# Patient Record
Sex: FEMALE | Race: WHITE | NOT HISPANIC OR LATINO | Employment: OTHER | URBAN - METROPOLITAN AREA
[De-identification: names, ages, dates, MRNs, and addresses within clinical notes are randomized per-mention and may not be internally consistent; named-entity substitution may affect disease eponyms.]

---

## 2017-08-22 PROBLEM — J40 BRONCHITIS: Status: ACTIVE | Noted: 2017-08-22

## 2018-10-03 PROBLEM — R10.13 EPIGASTRIC PAIN: Status: ACTIVE | Noted: 2018-10-03

## 2020-06-03 PROBLEM — J43.2 CENTRIACINAR EMPHYSEMA: Status: ACTIVE | Noted: 2020-06-03

## 2022-05-11 ENCOUNTER — TELEPHONE (OUTPATIENT)
Dept: FAMILY MEDICINE | Facility: CLINIC | Age: 82
End: 2022-05-11
Payer: MEDICARE

## 2022-05-11 NOTE — TELEPHONE ENCOUNTER
----- Message from Lynette Sweeney sent at 5/11/2022  2:22 PM CDT -----  Regarding: appt  Contact: [phone]  Patient Carina is calling. Patient would like to be seen by Dr Diaz only and no appts are showing. Please call patient at [phone]      Thank You

## 2022-05-11 NOTE — TELEPHONE ENCOUNTER
Patient reports her daughter, son, and son's mother in law see you as patients and you came highly recommended. Requesting to schedule appt to establish care.

## 2022-05-20 ENCOUNTER — OFFICE VISIT (OUTPATIENT)
Dept: FAMILY MEDICINE | Facility: CLINIC | Age: 82
End: 2022-05-20
Payer: MEDICARE

## 2022-05-20 VITALS
DIASTOLIC BLOOD PRESSURE: 70 MMHG | BODY MASS INDEX: 16.93 KG/M2 | HEIGHT: 65 IN | OXYGEN SATURATION: 97 % | TEMPERATURE: 98 F | WEIGHT: 101.63 LBS | SYSTOLIC BLOOD PRESSURE: 130 MMHG | HEART RATE: 102 BPM | RESPIRATION RATE: 18 BRPM

## 2022-05-20 DIAGNOSIS — M79.7 FIBROMYALGIA: ICD-10-CM

## 2022-05-20 DIAGNOSIS — L40.9 PSORIASIS OF SCALP: ICD-10-CM

## 2022-05-20 DIAGNOSIS — E03.9 HYPOTHYROIDISM, UNSPECIFIED TYPE: ICD-10-CM

## 2022-05-20 DIAGNOSIS — M81.0 AGE-RELATED OSTEOPOROSIS WITHOUT CURRENT PATHOLOGICAL FRACTURE: ICD-10-CM

## 2022-05-20 DIAGNOSIS — J43.9 PULMONARY EMPHYSEMA, UNSPECIFIED EMPHYSEMA TYPE: ICD-10-CM

## 2022-05-20 DIAGNOSIS — D75.839 THROMBOCYTOSIS: ICD-10-CM

## 2022-05-20 DIAGNOSIS — I49.5 SSS (SICK SINUS SYNDROME): ICD-10-CM

## 2022-05-20 DIAGNOSIS — I87.2 VENOUS INSUFFICIENCY: ICD-10-CM

## 2022-05-20 DIAGNOSIS — I48.0 PAROXYSMAL ATRIAL FIBRILLATION: Primary | ICD-10-CM

## 2022-05-20 DIAGNOSIS — M79.10 MYALGIA: ICD-10-CM

## 2022-05-20 DIAGNOSIS — K21.9 GASTROESOPHAGEAL REFLUX DISEASE WITHOUT ESOPHAGITIS: ICD-10-CM

## 2022-05-20 DIAGNOSIS — Z95.0 PACEMAKER: ICD-10-CM

## 2022-05-20 DIAGNOSIS — I77.6 ARTERITIS, UNSPECIFIED: ICD-10-CM

## 2022-05-20 LAB
ALBUMIN SERPL BCP-MCNC: 3.6 G/DL (ref 3.5–5.2)
ALP SERPL-CCNC: 106 U/L (ref 55–135)
ALT SERPL W/O P-5'-P-CCNC: 19 U/L (ref 10–44)
ANION GAP SERPL CALC-SCNC: 12 MMOL/L (ref 8–16)
AST SERPL-CCNC: 16 U/L (ref 10–40)
BASOPHILS # BLD AUTO: 0.03 K/UL (ref 0–0.2)
BASOPHILS NFR BLD: 0.4 % (ref 0–1.9)
BILIRUB SERPL-MCNC: 0.5 MG/DL (ref 0.1–1)
BUN SERPL-MCNC: 23 MG/DL (ref 8–23)
CALCIUM SERPL-MCNC: 9.3 MG/DL (ref 8.7–10.5)
CHLORIDE SERPL-SCNC: 98 MMOL/L (ref 95–110)
CHOLEST SERPL-MCNC: 162 MG/DL (ref 120–199)
CHOLEST/HDLC SERPL: 2.5 {RATIO} (ref 2–5)
CK SERPL-CCNC: 29 U/L (ref 20–180)
CO2 SERPL-SCNC: 29 MMOL/L (ref 23–29)
CREAT SERPL-MCNC: 0.7 MG/DL (ref 0.5–1.4)
CRP SERPL-MCNC: 51.2 MG/L (ref 0–8.2)
DIFFERENTIAL METHOD: ABNORMAL
EOSINOPHIL # BLD AUTO: 0 K/UL (ref 0–0.5)
EOSINOPHIL NFR BLD: 0.4 % (ref 0–8)
ERYTHROCYTE [DISTWIDTH] IN BLOOD BY AUTOMATED COUNT: 13.5 % (ref 11.5–14.5)
ERYTHROCYTE [SEDIMENTATION RATE] IN BLOOD BY WESTERGREN METHOD: 18 MM/HR (ref 0–36)
EST. GFR  (AFRICAN AMERICAN): >60 ML/MIN/1.73 M^2
EST. GFR  (NON AFRICAN AMERICAN): >60 ML/MIN/1.73 M^2
GLUCOSE SERPL-MCNC: 95 MG/DL (ref 70–110)
HCT VFR BLD AUTO: 42.1 % (ref 37–48.5)
HDLC SERPL-MCNC: 66 MG/DL (ref 40–75)
HDLC SERPL: 40.7 % (ref 20–50)
HGB BLD-MCNC: 13 G/DL (ref 12–16)
IMM GRANULOCYTES # BLD AUTO: 0.02 K/UL (ref 0–0.04)
IMM GRANULOCYTES NFR BLD AUTO: 0.3 % (ref 0–0.5)
LDLC SERPL CALC-MCNC: 84 MG/DL (ref 63–159)
LYMPHOCYTES # BLD AUTO: 1.3 K/UL (ref 1–4.8)
LYMPHOCYTES NFR BLD: 16.7 % (ref 18–48)
MCH RBC QN AUTO: 29.3 PG (ref 27–31)
MCHC RBC AUTO-ENTMCNC: 30.9 G/DL (ref 32–36)
MCV RBC AUTO: 95 FL (ref 82–98)
MONOCYTES # BLD AUTO: 0.7 K/UL (ref 0.3–1)
MONOCYTES NFR BLD: 9.9 % (ref 4–15)
NEUTROPHILS # BLD AUTO: 5.4 K/UL (ref 1.8–7.7)
NEUTROPHILS NFR BLD: 72.3 % (ref 38–73)
NONHDLC SERPL-MCNC: 96 MG/DL
NRBC BLD-RTO: 0 /100 WBC
PHOSPHATE SERPL-MCNC: 3.6 MG/DL (ref 2.7–4.5)
PLATELET # BLD AUTO: 324 K/UL (ref 150–450)
PMV BLD AUTO: 11.6 FL (ref 9.2–12.9)
POTASSIUM SERPL-SCNC: 4 MMOL/L (ref 3.5–5.1)
PROT SERPL-MCNC: 7.7 G/DL (ref 6–8.4)
RBC # BLD AUTO: 4.43 M/UL (ref 4–5.4)
SODIUM SERPL-SCNC: 139 MMOL/L (ref 136–145)
TRIGL SERPL-MCNC: 60 MG/DL (ref 30–150)
TSH SERPL DL<=0.005 MIU/L-ACNC: 2.89 UIU/ML (ref 0.4–4)
WBC # BLD AUTO: 7.5 K/UL (ref 3.9–12.7)

## 2022-05-20 PROCEDURE — 80053 COMPREHEN METABOLIC PANEL: CPT | Performed by: INTERNAL MEDICINE

## 2022-05-20 PROCEDURE — 36415 PR COLLECTION VENOUS BLOOD,VENIPUNCTURE: ICD-10-PCS | Mod: S$GLB,,, | Performed by: INTERNAL MEDICINE

## 2022-05-20 PROCEDURE — 99204 PR OFFICE/OUTPT VISIT, NEW, LEVL IV, 45-59 MIN: ICD-10-PCS | Mod: S$GLB,,, | Performed by: INTERNAL MEDICINE

## 2022-05-20 PROCEDURE — 84443 ASSAY THYROID STIM HORMONE: CPT | Performed by: INTERNAL MEDICINE

## 2022-05-20 PROCEDURE — 86140 C-REACTIVE PROTEIN: CPT | Performed by: INTERNAL MEDICINE

## 2022-05-20 PROCEDURE — 84100 ASSAY OF PHOSPHORUS: CPT | Performed by: INTERNAL MEDICINE

## 2022-05-20 PROCEDURE — 86038 ANTINUCLEAR ANTIBODIES: CPT | Performed by: INTERNAL MEDICINE

## 2022-05-20 PROCEDURE — 84165 PATHOLOGIST INTERPRETATION SPE: ICD-10-PCS | Mod: 26,,, | Performed by: PATHOLOGY

## 2022-05-20 PROCEDURE — 99204 OFFICE O/P NEW MOD 45 MIN: CPT | Mod: S$GLB,,, | Performed by: INTERNAL MEDICINE

## 2022-05-20 PROCEDURE — 36415 COLL VENOUS BLD VENIPUNCTURE: CPT | Mod: S$GLB,,, | Performed by: INTERNAL MEDICINE

## 2022-05-20 PROCEDURE — 82550 ASSAY OF CK (CPK): CPT | Performed by: INTERNAL MEDICINE

## 2022-05-20 PROCEDURE — 80061 LIPID PANEL: CPT | Performed by: INTERNAL MEDICINE

## 2022-05-20 PROCEDURE — 84165 PROTEIN E-PHORESIS SERUM: CPT | Mod: 26,,, | Performed by: PATHOLOGY

## 2022-05-20 PROCEDURE — 84165 PROTEIN E-PHORESIS SERUM: CPT | Performed by: INTERNAL MEDICINE

## 2022-05-20 PROCEDURE — 85025 COMPLETE CBC W/AUTO DIFF WBC: CPT | Performed by: INTERNAL MEDICINE

## 2022-05-20 PROCEDURE — 85652 RBC SED RATE AUTOMATED: CPT | Performed by: INTERNAL MEDICINE

## 2022-05-20 PROCEDURE — 86592 SYPHILIS TEST NON-TREP QUAL: CPT | Performed by: INTERNAL MEDICINE

## 2022-05-20 RX ORDER — CHOLECALCIFEROL (VITAMIN D3) 25 MCG
1000 TABLET ORAL
COMMUNITY

## 2022-05-20 RX ORDER — CALCIUM CARBONATE 500(1250)
1 TABLET,CHEWABLE ORAL DAILY
COMMUNITY

## 2022-05-20 RX ORDER — MAG HYDROX/ALUMINUM HYD/SIMETH 200-200-20
30 SUSPENSION, ORAL (FINAL DOSE FORM) ORAL
COMMUNITY

## 2022-05-20 NOTE — PROGRESS NOTES
Subjective:       Patient ID: Carina Elise is a 81 y.o. female.    Medication List with Changes/Refills   Current Medications    ALUMINUM-MAGNESIUM HYDROXIDE-SIMETHICONE (MAALOX) 200-200-20 MG/5 ML SUSP    Take 30 mLs by mouth 4 (four) times daily before meals and nightly.    ASPIRIN (ECOTRIN) 81 MG EC TABLET    Take 81 mg by mouth once daily.    CALCIUM CARBONATE (OS-TYRELL) 500 MG CALCIUM (1,250 MG) CHEWABLE TABLET    Take 1 tablet by mouth once daily.    LEVOTHYROXINE (SYNTHROID) 112 MCG TABLET    Take 112 mcg by mouth once daily.    LIOTHYRONINE (CYTOMEL) 5 MCG TAB    Take 5 mcg by mouth once daily.    LYSINE ORAL    Take 500 mg by mouth once daily.    METOPROLOL SUCCINATE (TOPROL-XL) 25 MG 24 HR TABLET    Take 12.5 mg by mouth once daily. Take half tab daily    PROPAFENONE (RHTHYMOL) 150 MG TAB    Take 150 mg by mouth 2 (two) times daily.    VITAMIN D (VITAMIN D3) 1000 UNITS TAB    Take 1,000 Units by mouth.       Chief Complaint: Establish Care  She is a new patient here today for a second opinion.     Today she presents with worsening weakness and a second opinion about recommendation for a temporal artery biopsy.  She fell in November of 2021 and hit her head and LOC. CT of the head was negative but she started having significant shoulder muscle pain after that fall. She has tight muscles in bilateral trapezius muscles that radiate from her neck into her scalp.  She tried dry needling without success.  In April she developed worsening myalgias and attributed this to a flare in her fibromyalgia.  At the same time she developed pain in her jaw with difficulty opening her mouth. She began with sensitivity on her bilateral temples and forehead.  She was noted to have elevated inflammatory markers on 5/2022 with ESR 64 and CRP 12.  She was followed by physician who was concerned about PMR vs TA and recommended TA biopsy but patient refused. She was tried on low dose oral prednisone (because she could not open her  mouth to swallow pills) but this made no change in her symptoms so was stopped. She was seen by a tmj specialist who did not feel her symptoms were due to tmj but rather due to muscle weakness coming from her tight trapezius muscles. She was given a wedge to wear at night which alleviated any pain and is doing exercises to strengthen her muscles.  She denies any pain with chewing but does fatigue with chewing. She has been on an all liquid diet for 6 weeks. She does feel over the last 6 weeks her symptoms are improving. She continues with generalized weakness with some pain in her muscles but no difficulty using her muscles of her arms or legs. She continues with weakness with chewing food and trouble opening her mouth fully even though this is also improving. She denies headaches but does have bilateral temple and forehead sensitivity and head pressure.  No vision changes. No difficulty getting out of a chair or brushing her hair. No fevers. No joint pain. Her neck muscles still are very tight and cause her pain.  She still does not want biopsy. CT of the soft tissue of the neck on 5/2022 was normal.     She has emphysema and is followed by pulmonary. She was last seen on 3/2021. She has chronic shortness of breath but denies wheezing or coughing. She does not use inhalers. CXR on 5/2022 was clear except for hyperinflation.     She as paroxysmal Afib s/p RFA in 2011.  She has a pacemaker due SSS and denies any recurrent Afib since ablation. No known CAD or hypertension. She is taking rhthymol 150 mg tid and metoprolol 12.5 mg daily. She is anticoagulated with aspirin daily. She is managed by Dr. Lao in cardiology and was seen on 1/2022.     She has venous insufficiency with intermittent lower leg swelling. She had venous ablation surgery in the past.     She has hypothyroid and is taking levothyroxine 112 mcg daily and cytomel 25 mcg daily.  She feels this is well controlled and TSH on 1/2022 was normal.      She has scalp psoriasis for many years. She is not on treatment.     She has a heartburn for many years. She had EGD on 10/2018 diagnosed with gastritis and dilated. Biopsy was negative. She continues to have episodes of heartburn with pain despite taking mylanta. She is on no other treatment.     She has fibromyalgia for many years that will flare with muscle aches and weakness. She is treated with regular exercise through physical therapy.     She has osteopenia with a DEXA on 12/2021 but does not have the results today. She is not on treatment.     She lives alone and her daughter is involved with her care. She exercises regularly with PT. She eats healthy.     Colonoscopy---refused   Mammogram----12/2021  DEXA-----12/2021 (don't have the results)  Tdap---unknown  Influenza vaccine---none  Pneumovax 23----refused  Prevnar 13----10/2011  Shingrex vaccine-----refused   Covid vaccine---none     Review of Systems   Constitutional: Positive for fatigue. Negative for appetite change, fever and unexpected weight change.   HENT: Positive for trouble swallowing. Negative for congestion, ear pain, hearing loss and sore throat.    Eyes: Negative for pain and visual disturbance.   Respiratory: Positive for shortness of breath. Negative for cough, chest tightness and wheezing.    Cardiovascular: Positive for leg swelling. Negative for chest pain and palpitations.   Gastrointestinal: Negative for abdominal pain, blood in stool, constipation, diarrhea, nausea and vomiting.   Endocrine: Negative for polyuria.   Genitourinary: Negative for dysuria and hematuria.   Musculoskeletal: Positive for myalgias, neck pain and neck stiffness. Negative for arthralgias and back pain.   Skin: Negative for rash.   Neurological: Positive for weakness. Negative for dizziness, numbness and headaches.   Hematological: Does not bruise/bleed easily.   Psychiatric/Behavioral: Positive for dysphoric mood and sleep disturbance. Negative for  "suicidal ideas. The patient is nervous/anxious.        Objective:      Vitals:    05/20/22 0815   BP: 130/70   Pulse: 102   Resp: 18   Temp: 98.4 °F (36.9 °C)   TempSrc: Temporal   SpO2: 97%   Weight: 46.1 kg (101 lb 10.1 oz)   Height: 5' 5" (1.651 m)     Body mass index is 16.91 kg/m².  Physical Exam    General appearance: No acute distress, cooperative, kyphotic curve   Eyes: PERRL, EOMI, conjunctiva clear  Ears: normal external ear and pinna, tm clear without drainage, canals clear  Nose: Normal mucosa without drainage  Throat: no exudates or erythema, tonsils not enlarged  Mouth: no sores or lesions, moist mucous membranes  Neck: FROM, soft, supple, no thyromegaly, no bruits  Lymph: no anterior or posterior cervical adenopathy  Heart::  Regular rate and rhythm, no murmur  Lung: Clear to ascultation bilaterally, no wheezing, no rales, no rhonchi, no distress  Abdomen: Soft, nontender, no distention, no hepatosplenomegaly, bowel sounds normal, no guarding, no rebound, no peritoneal signs  Skin: no rashes, no lesions  Extremities: no edema, no cyanosis  Neuro: CN 2-12 intact, 5/5 muscle strength upper and lower extremity bilaterally, 2+ DTRs UE and LE bilaterally, normal gait  Peripheral pulses: 1+ pedal pulses bilaterally, good perfusion and color  Musculoskeletal: FROM, good strenth, tight and tenderness bilateral trapezius muscles   Joint: normal appearance, no swelling, no warmth, no deformity in all joints    Assessment:       1. Paroxysmal atrial fibrillation    2. SSS (sick sinus syndrome)    3. Pacemaker    4. Venous insufficiency    5. Pulmonary emphysema, unspecified emphysema type    6. Hypothyroidism, unspecified type    7. Arteritis, unspecified     8. Myalgia    9. Thrombocytosis    10. Gastroesophageal reflux disease without esophagitis    11. Fibromyalgia    12. Psoriasis of scalp    13. Age-related osteoporosis without current pathological fracture        Plan:       Paroxysmal atrial " fibrillation  NSR today on exam. Continue rhthymol and anticoagulation with aspirin and metoprolol. She continues to follow with cardiology.   -     Lipid Panel    SSS (sick sinus syndrome) with Pacemaker  She continues in interrogation clinic with cardiology.    Venous insufficiency  No active swelling in lower legs.    Pulmonary emphysema, unspecified emphysema type  Stable and no active symptoms.    Hypothyroidism, unspecified type  Continue this dose of levothyroxine and will check TSH today.   -     TSH    Arteritis rule out  Will repeat labs today with inflammatory makers, SPEP and ALONSO. Will get temporal artery u/s. Consider reaching out to rheumatology for their opinion once I have results of her labs/imaging.   -     US Temporal Artery Bilateral; Future; Expected date: 05/20/2022    Myalgia  No weakness on exam today and no pain with testing. She feels due to her fibromyalgia. She does not want oral steroids.   -     CBC Auto Differential  -     Comprehensive Metabolic Panel  -     ALONSO Screen w/Reflex  -     C-Reactive Protein  -     Sedimentation rate  -     RPR  -     PROTEIN ELECTROPHORESIS, SERUM  -     PHOSPHORUS  -     CK  -     US Temporal Artery Bilateral; Future; Expected date: 05/20/2022    Thrombocytosis  Noted on labs and will recheck today.     Gastroesophageal reflux disease without esophagitis  Uncontrolled and consider starting famotidine at her f/u appt for symptoms.    Fibromyalgia  Uncontrolled with recent flare. She feels this accounts for her symptoms but still a concern for PMR.  Will get labs and re-address at her f/u in one week.    Psoriasis of scalp  Advised to continue topical treatment.    Age-related osteoporosis without current pathological fracture  By physical exam she looks OP and will get the results of her DEXA.     Follow up in about 1 week (around 5/27/2022) for to discuss lab results .

## 2022-05-21 ENCOUNTER — TELEPHONE (OUTPATIENT)
Dept: FAMILY MEDICINE | Facility: CLINIC | Age: 82
End: 2022-05-21
Payer: MEDICARE

## 2022-05-21 LAB — RPR SER QL: NORMAL

## 2022-05-21 NOTE — TELEPHONE ENCOUNTER
Please let her know that I want to get a temporal artery u/s to look at the arteries in her head because I think this will help us get a better feel for what is going on and it is a very easy test.     Please schedule before her appt with me so that I have the results     Thanks

## 2022-05-23 LAB
ALBUMIN SERPL ELPH-MCNC: 3.4 G/DL (ref 3.35–5.55)
ALPHA1 GLOB SERPL ELPH-MCNC: 0.69 G/DL (ref 0.17–0.41)
ALPHA2 GLOB SERPL ELPH-MCNC: 1.03 G/DL (ref 0.43–0.99)
ANA SER QL IF: NORMAL
B-GLOBULIN SERPL ELPH-MCNC: 1.21 G/DL (ref 0.5–1.1)
GAMMA GLOB SERPL ELPH-MCNC: 1.27 G/DL (ref 0.67–1.58)
PROT SERPL-MCNC: 7.6 G/DL (ref 6–8.4)

## 2022-05-24 LAB — PATHOLOGIST INTERPRETATION SPE: NORMAL

## 2022-06-01 ENCOUNTER — HOSPITAL ENCOUNTER (OUTPATIENT)
Dept: RADIOLOGY | Facility: HOSPITAL | Age: 82
Discharge: HOME | End: 2022-06-01
Attending: INTERNAL MEDICINE
Payer: MEDICARE

## 2022-06-01 DIAGNOSIS — I77.6 ARTERITIS, UNSPECIFIED: ICD-10-CM

## 2022-06-01 DIAGNOSIS — M79.10 MYALGIA: ICD-10-CM

## 2022-06-01 PROCEDURE — 93880 EXTRACRANIAL BILAT STUDY: CPT | Mod: 26,,, | Performed by: RADIOLOGY

## 2022-06-01 PROCEDURE — 93880 EXTRACRANIAL BILAT STUDY: CPT | Mod: TC,PO

## 2022-06-01 PROCEDURE — 93880 US TEMPORAL ARTERY BILATERAL: ICD-10-PCS | Mod: 26,,, | Performed by: RADIOLOGY

## 2022-06-06 ENCOUNTER — OFFICE VISIT (OUTPATIENT)
Dept: FAMILY MEDICINE | Facility: CLINIC | Age: 82
End: 2022-06-06
Payer: MEDICARE

## 2022-06-06 VITALS
WEIGHT: 100.5 LBS | BODY MASS INDEX: 16.75 KG/M2 | HEIGHT: 65 IN | RESPIRATION RATE: 12 BRPM | OXYGEN SATURATION: 96 % | DIASTOLIC BLOOD PRESSURE: 70 MMHG | SYSTOLIC BLOOD PRESSURE: 130 MMHG | TEMPERATURE: 98 F | HEART RATE: 71 BPM

## 2022-06-06 DIAGNOSIS — J43.9 PULMONARY EMPHYSEMA, UNSPECIFIED EMPHYSEMA TYPE: Primary | ICD-10-CM

## 2022-06-06 DIAGNOSIS — K21.9 GASTROESOPHAGEAL REFLUX DISEASE WITHOUT ESOPHAGITIS: ICD-10-CM

## 2022-06-06 DIAGNOSIS — R51.9 NONINTRACTABLE HEADACHE, UNSPECIFIED CHRONICITY PATTERN, UNSPECIFIED HEADACHE TYPE: ICD-10-CM

## 2022-06-06 DIAGNOSIS — M79.10 MYALGIA: ICD-10-CM

## 2022-06-06 DIAGNOSIS — L40.9 PSORIASIS: ICD-10-CM

## 2022-06-06 PROCEDURE — 99214 OFFICE O/P EST MOD 30 MIN: CPT | Mod: S$GLB,,, | Performed by: INTERNAL MEDICINE

## 2022-06-06 PROCEDURE — 99214 PR OFFICE/OUTPT VISIT, EST, LEVL IV, 30-39 MIN: ICD-10-PCS | Mod: S$GLB,,, | Performed by: INTERNAL MEDICINE

## 2022-06-06 RX ORDER — FAMOTIDINE 20 MG/1
20 TABLET, FILM COATED ORAL 2 TIMES DAILY
Qty: 180 TABLET | Refills: 3 | Status: SHIPPED | OUTPATIENT
Start: 2022-06-06 | End: 2022-11-30 | Stop reason: SINTOL

## 2022-06-06 RX ORDER — KETOCONAZOLE 20 MG/ML
SHAMPOO, SUSPENSION TOPICAL
Qty: 120 ML | Refills: 6 | Status: SHIPPED | OUTPATIENT
Start: 2022-06-06 | End: 2024-01-09 | Stop reason: SDUPTHER

## 2022-06-06 RX ORDER — LEVALBUTEROL INHALATION SOLUTION 1.25 MG/3ML
1 SOLUTION RESPIRATORY (INHALATION) EVERY 4 HOURS PRN
Qty: 1080 ML | Refills: 5 | Status: SHIPPED | OUTPATIENT
Start: 2022-06-06 | End: 2023-02-14 | Stop reason: SDUPTHER

## 2022-06-06 NOTE — PROGRESS NOTES
Subjective:       Patient ID: Carina Elise is a 81 y.o. female.    Medication List with Changes/Refills   New Medications    FAMOTIDINE (PEPCID) 20 MG TABLET    Take 1 tablet (20 mg total) by mouth 2 (two) times daily.    KETOCONAZOLE (NIZORAL) 2 % SHAMPOO    Apply topically twice a week.    LEVALBUTEROL (XOPENEX) 1.25 MG/3 ML NEBULIZER SOLUTION    Take 3 mLs (1.25 mg total) by nebulization every 4 (four) hours as needed for Wheezing. Rescue   Current Medications    ALUMINUM-MAGNESIUM HYDROXIDE-SIMETHICONE (MAALOX) 200-200-20 MG/5 ML SUSP    Take 30 mLs by mouth 4 (four) times daily before meals and nightly.    ASPIRIN (ECOTRIN) 81 MG EC TABLET    Take 81 mg by mouth once daily.    CALCIUM CARBONATE (OS-TYRELL) 500 MG CALCIUM (1,250 MG) CHEWABLE TABLET    Take 1 tablet by mouth once daily.    LEVOTHYROXINE (SYNTHROID) 112 MCG TABLET    Take 112 mcg by mouth once daily.    LIOTHYRONINE (CYTOMEL) 5 MCG TAB    Take 5 mcg by mouth once daily.    LYSINE ORAL    Take 500 mg by mouth once daily.    METOPROLOL SUCCINATE (TOPROL-XL) 25 MG 24 HR TABLET    Take 12.5 mg by mouth once daily. Take half tab daily    PROPAFENONE (RHTHYMOL) 150 MG TAB    Take 150 mg by mouth 2 (two) times daily.    VITAMIN D (VITAMIN D3) 1000 UNITS TAB    Take 1,000 Units by mouth.       Chief Complaint: Follow-up  She is here today to f/u on her labs and imaging from her appt on 5/20/2022.    Recall there was concern for the diagnosis of temporal arteritis due to elevated inflammatory markers, headaches and weakness.  She was noted to have jaw pain due to chewing fatigue but no claudication.  She had refused the TA biopsy in the past.  Labs showed ESR of 18 with CRP of 51.  Normal u/s of the temporal arteries.  Normal CBC, CMP and SPEP.  Today she reports her headaches are resolved. She does have some sensitivity in her scalp but this is improved. She has uncontrolled psoriasis in the scalp and she started taking L-lysine with good response.  She  "denies any jaw issues today after having seen the dentist.  She is chewing well and is able to eat normal foods. Her appetite is improved. She reports her muscle pain is getting better. She has fibromyalgia and feels like she is getting over a flare. She denies any weakness. No fevers. No vision changes.     She continues to have reflux symptoms with heartburn. She is not taking any medication but struggled with PPI in the past. She had an EGD in 2018 that showed gastritis. This reflux causes her to wheeze at times and she persists with a chronic cough that is nonproductive. CXR done on 4/2022 was negative.     Review of Systems    Objective:      Vitals:    06/06/22 1102   BP: 130/70   Pulse: 71   Resp: 12   Temp: 98.1 °F (36.7 °C)   TempSrc: Temporal   SpO2: 96%   Weight: 45.6 kg (100 lb 8.5 oz)   Height: 5' 5" (1.651 m)     Body mass index is 16.73 kg/m².  Physical Exam    General appearance: No acute distress, cooperative  Eyes: PERRL, EOMI, conjunctiva clear  HEENT: ears normal, nose without rhinorrhea, normal turbinates,mouth no sores or lesions, throat no erythema or pus  Neck: FROM, soft, supple, no thyromegaly, no bruits  Lymph: no anterior or posterior cervical adenopathy  Heart::  Regular rate and rhythm, no murmur  Lung: Clear to ascultation bilaterally, occasional wheezing, no rales, no rhonchi, no distress  Abdomen: Soft, nontender, no distention, no hepatosplenomegaly, bowel sounds normal, no guarding, no rebound, no peritoneal signs  Skin: silvery plaques in right scalp, mild hair loss   Extremities: no edema, no cyanosis  Neuro: no focal abnormalities, strength 5/5 b/l UE and LE, 2+ DTRs b/l UE and LE, normal gait  Peripheral pulses: 2+ pedal pulses bilaterally, good perfusion and color  Musculoskeletal: FROM, good strenth, no tenderness  Joint: normal appearance, no swelling, no warmth, no deformity in all joints    Assessment:       1. Pulmonary emphysema, unspecified emphysema type    2. " Gastroesophageal reflux disease without esophagitis    3. Myalgia    4. Nonintractable headache, unspecified chronicity pattern, unspecified headache type    5. Psoriasis        Plan:       Pulmonary emphysema, unspecified emphysema type  I suspect coughing due to GERD with bronchospasm. Will start xopenex bid (she is unable to tolerate albuterol) for about a week to see if improves symptoms. She had a recent reassuring CXR.   -     levalbuterol (XOPENEX) 1.25 mg/3 mL nebulizer solution; Take 3 mLs (1.25 mg total) by nebulization every 4 (four) hours as needed for Wheezing. Rescue  Dispense: 1080 mL; Refill: 5    Gastroesophageal reflux disease without esophagitis  Uncontrolled and will start famotidine 20 mg bid.   -     famotidine (PEPCID) 20 MG tablet; Take 1 tablet (20 mg total) by mouth 2 (two) times daily.  Dispense: 180 tablet; Refill: 3    Myalgia with headaches  This has improved. CT and u/s done recently all reassuring. ESR is back to normal. Continue to follow CRP which continues to be elevated. Her symptoms are improved. No headaches/vision changes/jaw pain/joint pain or muscle pain.  Continue to monitor.   -     CBC Auto Differential; Future; Expected date: 06/06/2022  -     Comprehensive Metabolic Panel; Future; Expected date: 06/06/2022  -     Sedimentation rate; Future; Expected date: 06/06/2022  -     C-Reactive Protein; Future; Expected date: 06/06/2022    Psoriasis  Will do a trail of ketoconazole shampoo to see if she has a component of sebopsoriasis.   -     ketoconazole (NIZORAL) 2 % shampoo; Apply topically twice a week.  Dispense: 120 mL; Refill: 6    Follow up in about 4 months (around 10/6/2022) for chronic medical issues.

## 2022-06-06 NOTE — PATIENT INSTRUCTIONS
Start famotidine 20 mg twice a day    Start xopenex nebulized twice a day for coughing for about a week.

## 2022-06-13 ENCOUNTER — TELEPHONE (OUTPATIENT)
Dept: FAMILY MEDICINE | Facility: CLINIC | Age: 82
End: 2022-06-13
Payer: MEDICARE

## 2022-06-13 DIAGNOSIS — M79.7 FIBROMYALGIA: Primary | ICD-10-CM

## 2022-06-13 NOTE — TELEPHONE ENCOUNTER
----- Message from Zenia Walls sent at 6/13/2022 11:37 AM CDT -----  Contact: oralia  Type:  Needs Medical Advice    Who Called:  Patient     Would the patient rather a call back or a response via [facility]?  Call    Best Call Back Number:  [phone]     Additional Information: Oralia  with [facility] needs outpatient  PT orders faxed to the them     Please fax to [phone]

## 2022-06-15 NOTE — TELEPHONE ENCOUNTER
Didier unsure of any other diagnosis aside from fibromyalgia pain. States patient contacted their facility to confirm they received PT referral that she was told would come from her PCP

## 2022-06-16 DIAGNOSIS — K21.9 GASTROESOPHAGEAL REFLUX DISEASE WITHOUT ESOPHAGITIS: ICD-10-CM

## 2022-06-16 RX ORDER — FAMOTIDINE 20 MG/1
TABLET, FILM COATED ORAL
Refills: 0 | OUTPATIENT
Start: 2022-06-16

## 2022-06-16 NOTE — TELEPHONE ENCOUNTER
No new care gaps identified.  [facility] Embedded Care Gaps. Reference number: [id number]. 6/16/2022   10:43:47 AM JENNYT

## 2022-06-16 NOTE — TELEPHONE ENCOUNTER
Refill Decision Note   Carina Elise  is requesting a refill authorization.  Brief Assessment and Rationale for Refill:  Quick Discontinue     Medication Therapy Plan:    Pharmacy is requesting new scripts for the following medications without required information, (sig/ frequency/qty/etc)      Medication Reconciliation Completed: No     Comments: Pharmacies have been requesting medications for patients without required information, (sig, frequency, qty, etc.). In addition, requests are sent for medication(s) pt. are currently not taking, and medications patients have never taken.    We have spoken to the pharmacies about these request types and advised their teams previously that we are unable to assess these New Script requests and require all details for these requests. This is a known issue and has been reported.     Note composed:11:22 AM 06/16/2022

## 2022-06-22 ENCOUNTER — TELEPHONE (OUTPATIENT)
Dept: FAMILY MEDICINE | Facility: CLINIC | Age: 82
End: 2022-06-22
Payer: MEDICARE

## 2022-06-22 NOTE — TELEPHONE ENCOUNTER
----- Message from Cristina Jg sent at 6/22/2022  1:07 PM CDT -----  Regarding: Pt's called to speak to the nurse regarding her care concerning physical therapy and to discuss how she is doing with her medication  Patient Advice:    Pt's called to speak to the nurse regarding her care concerning physical therapy and to discuss how she is doing with her medication. Pt would like a call back today.    Pt can be reached at [phone]

## 2022-07-01 NOTE — TELEPHONE ENCOUNTER
----- Message from Aleyda Marin sent at 7/1/2022 10:20 AM CDT -----  Contact: LUIS GONG [[id number]] [phone]  Type:  RX Refill Request    Who Called: LUIS GONG [[id number]]  Refill or New Rx: Refill (historical med)  RX Name and Strength: liothyronine (CYTOMEL) 5 MCG Tab  How is the patient currently taking it? (ex. 1XDay): Take 5 mcg by mouth once daily. - Oral  Is this a 30 day or 90 day RX: 90    AND    RX Name and Strength: levothyroxine (SYNTHROID) 112 MCG tablet  How is the patient currently taking it? (ex. 1XDay): Take 112 mcg by mouth once daily. - Oral  Is this a 30 day or 90 day RX: 90  Preferred Pharmacy with phone number: [facility] - [address]  Local or Mail Order: Mail  Ordering Provider: Historical med  Would the patient rather a call back or a response via [facility]? Phone call   Best Call Back Number: [phone]

## 2022-07-13 NOTE — TELEPHONE ENCOUNTER
----- Message from Shireen Joel sent at 7/13/2022  9:20 AM CDT -----  Contact: LUIS GONG [[id number]] [phone]  Type:  RX Refill Request     Who Called: LUIS GONG [[id number]]  Refill or New Rx: Refill (historical med)  RX Name and Strength: liothyronine (CYTOMEL) 5 MCG Tab  How is the patient currently taking it? (ex. 1XDay): Take 5 mcg by mouth once daily. - Oral  Is this a 30 day or 90 day RX: 90     AND     RX Name and Strength: levothyroxine (SYNTHROID) 112 MCG tablet  How is the patient currently taking it? (ex. 1XDay): Take 112 mcg by mouth once daily. - Oral  Is this a 30 day or 90 day RX: 90  Preferred Pharmacy with phone number: [facility] - [address]  Local or Mail Order: Mail  Ordering Provider: Historical med  Would the patient rather a call back or a response via [facility]? Phone call   Best Call Back Number: [phone]      called last week and meds was never called in

## 2022-07-13 NOTE — TELEPHONE ENCOUNTER
No new care gaps identified.  [facility] Embedded Care Gaps. Reference number: [id number]. 7/13/2022   12:17:02 PM CDT

## 2022-07-15 RX ORDER — LIOTHYRONINE SODIUM 5 UG/1
5 TABLET ORAL DAILY
Qty: 90 TABLET | Refills: 2 | Status: SHIPPED | OUTPATIENT
Start: 2022-07-15 | End: 2023-05-08

## 2022-07-15 RX ORDER — LEVOTHYROXINE SODIUM 112 UG/1
112 TABLET ORAL DAILY
Qty: 90 TABLET | Refills: 2 | Status: SHIPPED | OUTPATIENT
Start: 2022-07-15 | End: 2022-07-18 | Stop reason: SDUPTHER

## 2022-07-15 NOTE — TELEPHONE ENCOUNTER
----- Message from Ashley Zacarias sent at 7/15/2022 10:36 AM CDT -----  Type:  Patient Returning Call    Who Called:pt     Does the patient know what this is regarding?:rx  Would the patient rather a call back or a response via [facility]? Call back   Best Call Back Number:[phone]  Additional Information: pt needs refill on  liothyronine (CYTOMEL) 5 MCG Tab and levothyroxine 112 MCG tablet. Pt [facility]. Pt has been waiting since Tuesday.

## 2022-07-18 ENCOUNTER — TELEPHONE (OUTPATIENT)
Dept: FAMILY MEDICINE | Facility: CLINIC | Age: 82
End: 2022-07-18
Payer: MEDICARE

## 2022-07-18 RX ORDER — LEVOTHYROXINE SODIUM 112 UG/1
112 TABLET ORAL DAILY
Qty: 30 TABLET | Refills: 2 | Status: SHIPPED | OUTPATIENT
Start: 2022-07-18 | End: 2022-07-20 | Stop reason: SDUPTHER

## 2022-07-18 NOTE — TELEPHONE ENCOUNTER
----- Message from Adeel Ramon sent at 7/18/2022 11:30 AM CDT -----  Name Of Caller:Carina            Provider Name:eTsha Diaz            Does patient feel the need to be seen today?No            Relationship to the Pt?:Patient            Contact Preference?:[phone]            What is the nature of the call?: Patient has questions medications prescribed

## 2022-07-18 NOTE — TELEPHONE ENCOUNTER
"Patient requesting a 90 day supply of brand name only synthroid be sent to [facility] as she is unable to take levothyroxine stating "it has never worked for me. I took it for years and it's almost like it works the opposite"     Also requesting a short supply be sent to local pharmacy as well.       "

## 2022-07-20 ENCOUNTER — TELEPHONE (OUTPATIENT)
Dept: FAMILY MEDICINE | Facility: CLINIC | Age: 82
End: 2022-07-20
Payer: MEDICARE

## 2022-07-20 RX ORDER — LEVOTHYROXINE SODIUM 112 UG/1
112 TABLET ORAL DAILY
Qty: 90 TABLET | Refills: 3 | Status: SHIPPED | OUTPATIENT
Start: 2022-07-20 | End: 2023-06-14 | Stop reason: SDUPTHER

## 2022-07-20 RX ORDER — LEVOTHYROXINE SODIUM 112 UG/1
TABLET ORAL
Refills: 0 | OUTPATIENT
Start: 2022-07-20

## 2022-07-20 NOTE — TELEPHONE ENCOUNTER
----- Message from Kin Barros sent at 7/20/2022 11:26 AM CDT -----  Regarding: Patient advice  Pt called in regards to getting an medication refill       levothyroxine (SYNTHROID) 112 MCG tablet 30 tablet 2 7/18/2022  Yes  Sig - Route: Take 1 tablet (112 mcg total) by mouth once daily. - Oral  Sent to pharmacy as: levothyroxine (SYNTHROID) 112 MCG tablet  Class: Normal  Order: [id number]  Date/Time Signed: 7/18/2022 18:01      E-Prescribing Status: Receipt confirmed by pharmacy (7/18/2022  6:02 PM CDT)    [facility] - [facility] [place] [address]  [phone]

## 2022-07-20 NOTE — TELEPHONE ENCOUNTER
Refill Decision Note   Carina Elise  is requesting a refill authorization.  Brief Assessment and Rationale for Refill:  Quick Discontinue     Medication Therapy Plan:    Pharmacy is requesting new scripts for the following medications without required information, (sig/ frequency/qty/etc)      Medication Reconciliation Completed: No     Comments: Pharmacies have been requesting medications for patients without required information, (sig, frequency, qty, etc.). In addition, requests are sent for medication(s) pt. are currently not taking, and medications patients have never taken.    We have spoken to the pharmacies about these request types and advised their teams previously that we are unable to assess these New Script requests and require all details for these requests. This is a known issue and has been reported.     Note composed:12:12 PM 07/20/2022

## 2022-07-20 NOTE — TELEPHONE ENCOUNTER
No new care gaps identified.  [facility] Embedded Care Gaps. Reference number: [id number]. 7/20/2022   12:07:24 PM CDT

## 2022-07-29 ENCOUNTER — TELEPHONE (OUTPATIENT)
Dept: FAMILY MEDICINE | Facility: CLINIC | Age: 82
End: 2022-07-29
Payer: MEDICARE

## 2022-07-29 RX ORDER — LEVOTHYROXINE SODIUM 112 UG/1
TABLET ORAL
Qty: 30 TABLET | Refills: 0 | Status: SHIPPED | OUTPATIENT
Start: 2022-07-29 | End: 2023-02-14 | Stop reason: SDUPTHER

## 2022-07-29 NOTE — TELEPHONE ENCOUNTER
----- Message from Lynette Lenate sent at 7/29/2022 12:37 PM CDT -----  Regarding: Medication Request  Contact: [phone]  Patient Carina is calling. Patient need an rx Synthroid 112MCG (No substituion) has to be written on the rx. Patient stated her mail order deliver is lost in the mail and need a new rx for a 30 day sent to the local pharm. Please call patient at [phone]    [id number] [address] [place] [facility] [place] [address]   Phone: [phone]  Fax:  [phone]        Thank You

## 2022-07-29 NOTE — TELEPHONE ENCOUNTER
Patient needs brand name only synthroid sent to local pharmacy, please advise in Dr. Diaz absence

## 2022-10-07 ENCOUNTER — TELEPHONE (OUTPATIENT)
Dept: FAMILY MEDICINE | Facility: CLINIC | Age: 82
End: 2022-10-07

## 2022-10-07 ENCOUNTER — OFFICE VISIT (OUTPATIENT)
Dept: FAMILY MEDICINE | Facility: CLINIC | Age: 82
End: 2022-10-07
Payer: MEDICARE

## 2022-10-07 VITALS
WEIGHT: 98.75 LBS | HEART RATE: 68 BPM | BODY MASS INDEX: 19.39 KG/M2 | DIASTOLIC BLOOD PRESSURE: 78 MMHG | OXYGEN SATURATION: 95 % | HEIGHT: 60 IN | TEMPERATURE: 98 F | RESPIRATION RATE: 20 BRPM | SYSTOLIC BLOOD PRESSURE: 126 MMHG

## 2022-10-07 DIAGNOSIS — I48.0 PAROXYSMAL ATRIAL FIBRILLATION: Primary | ICD-10-CM

## 2022-10-07 DIAGNOSIS — Z13.6 ENCOUNTER FOR LIPID SCREENING FOR CARDIOVASCULAR DISEASE: ICD-10-CM

## 2022-10-07 DIAGNOSIS — I87.2 VENOUS INSUFFICIENCY: ICD-10-CM

## 2022-10-07 DIAGNOSIS — F41.9 ANXIETY: ICD-10-CM

## 2022-10-07 DIAGNOSIS — R70.0 ELEVATED SED RATE: ICD-10-CM

## 2022-10-07 DIAGNOSIS — E03.9 HYPOTHYROIDISM, UNSPECIFIED TYPE: ICD-10-CM

## 2022-10-07 DIAGNOSIS — R13.10 DYSPHAGIA, UNSPECIFIED TYPE: ICD-10-CM

## 2022-10-07 DIAGNOSIS — M79.7 FIBROMYALGIA: ICD-10-CM

## 2022-10-07 DIAGNOSIS — M79.10 MYALGIA: ICD-10-CM

## 2022-10-07 DIAGNOSIS — Z95.0 PACEMAKER: ICD-10-CM

## 2022-10-07 DIAGNOSIS — M81.0 AGE-RELATED OSTEOPOROSIS WITHOUT CURRENT PATHOLOGICAL FRACTURE: ICD-10-CM

## 2022-10-07 DIAGNOSIS — I49.5 SSS (SICK SINUS SYNDROME): ICD-10-CM

## 2022-10-07 DIAGNOSIS — J43.9 PULMONARY EMPHYSEMA, UNSPECIFIED EMPHYSEMA TYPE: ICD-10-CM

## 2022-10-07 DIAGNOSIS — K21.9 GASTROESOPHAGEAL REFLUX DISEASE WITHOUT ESOPHAGITIS: ICD-10-CM

## 2022-10-07 DIAGNOSIS — Z13.220 ENCOUNTER FOR LIPID SCREENING FOR CARDIOVASCULAR DISEASE: ICD-10-CM

## 2022-10-07 PROCEDURE — 99214 OFFICE O/P EST MOD 30 MIN: CPT | Mod: S$GLB,,, | Performed by: INTERNAL MEDICINE

## 2022-10-07 PROCEDURE — 99214 PR OFFICE/OUTPT VISIT, EST, LEVL IV, 30-39 MIN: ICD-10-PCS | Mod: S$GLB,,, | Performed by: INTERNAL MEDICINE

## 2022-10-07 NOTE — TELEPHONE ENCOUNTER
Good morning.  Pt has a referral I for rheumatology for Fibromyalgia [M79.7]  Elevated sed rate [R70.0].  When I attempt to schedule, nothing for 2023 comes up.  Can you please assist in scheduling pt with any provider in rheumatology?  Dr. Diaz has questions about her dx.  Thank you.

## 2022-10-07 NOTE — PROGRESS NOTES
Subjective:       Patient ID: Carina Elise is a 82 y.o. female.    Medication List with Changes/Refills   Current Medications    ALUMINUM-MAGNESIUM HYDROXIDE-SIMETHICONE (MAALOX) 200-200-20 MG/5 ML SUSP    Take 30 mLs by mouth 4 (four) times daily before meals and nightly.    ASPIRIN (ECOTRIN) 81 MG EC TABLET    Take 81 mg by mouth once daily.    CALCIUM CARBONATE (OS-TYRELL) 500 MG CALCIUM (1,250 MG) CHEWABLE TABLET    Take 1 tablet by mouth once daily.    FAMOTIDINE (PEPCID) 20 MG TABLET    Take 1 tablet (20 mg total) by mouth 2 (two) times daily.    KETOCONAZOLE (NIZORAL) 2 % SHAMPOO    Apply topically twice a week.    LEVALBUTEROL (XOPENEX) 1.25 MG/3 ML NEBULIZER SOLUTION    Take 3 mLs (1.25 mg total) by nebulization every 4 (four) hours as needed for Wheezing. Rescue    LEVOTHYROXINE (SYNTHROID) 112 MCG TABLET    Take 1 tablet (112 mcg total) by mouth once daily.    LEVOTHYROXINE (SYNTHROID) 112 MCG TABLET    One table daily.  NAME BRAND NECESSARY    LIOTHYRONINE (CYTOMEL) 5 MCG TAB    Take 1 tablet (5 mcg total) by mouth once daily.    LYSINE ORAL    Take 500 mg by mouth once daily.    METOPROLOL SUCCINATE (TOPROL-XL) 25 MG 24 HR TABLET    Take 12.5 mg by mouth once daily. Take half tab daily    PROPAFENONE (RHTHYMOL) 150 MG TAB    Take 150 mg by mouth 2 (two) times daily.    VITAMIN D (VITAMIN D3) 1000 UNITS TAB    Take 1,000 Units by mouth.       Chief Complaint: Follow-up  She is here today to f/u on chronic medical issues.      She has emphysema and is followed by pulmonary. She was last seen on 3/2021. She has chronic shortness of breath but denies wheezing or coughing. She has xopenex but had not needed to use. CXR on 5/2022 was clear except for hyperinflation.      She as paroxysmal Afib s/p RFA in 2011.  She has a pacemaker due SSS and denies any recurrent Afib since ablation. No known CAD or hypertension. She is taking rhthymol 150 mg tid and metoprolol 12.5 mg daily. She is anticoagulated with aspirin  "daily. She is managed by Dr. Lao in cardiology and was seen on 7/2022. She had a stress echo per patient that was reassuring. She continues to have intermittent chest pressure that is associated with indigestion.  No shortness of breath.       She has venous insufficiency with intermittent lower leg swelling. She had venous ablation surgery in the past.      She has hypothyroid and is taking levothyroxine 112 mcg daily and cytomel 25 mcg daily.  She feels this is well controlled and TSH on 5/2022 was normal.      She has acid reflux with dyspepsia. She is not taking any medication but struggled with PPI in the past. She was tried on famotidine but this made her symptoms worse. She is getting relief with the use of mylicon drops. She had an EGD in 2018 that showed gastritis. She does complain of food getting stuck when she swallows. Worse with breads and dry foods.      Recall there was concern for the diagnosis of temporal arteritis due to elevated inflammatory markers, headaches and weakness.  She was noted to have jaw pain due to chewing fatigue but no claudication.  She had refused the TA biopsy in the past.  Labs showed ESR of 18 with CRP of 51.  Normal u/s of the temporal arteries.  Normal CBC, CMP and SPEP.  Today she reports her headaches are resolved.   She denies any jaw issues today. She is chewing well and is able to eat normal foods. Her appetite is improved. She continues to complain of diffuse muscle pain like "I have the flu".  No weakness of arms or legs. She started PT which is helping her pain which she attributes to fibromyalgia. Repeat inflammatory markers on 10/2022 were ESR 61 (up from 18) and CRP 2.3 (down from 51.2).       She has fibromyalgia for many years that will flare with muscle aches and weakness. She is treated with regular exercise through physical therapy.     She does reports increasing anxiety but denies depression. She has had for many years and has only tried xanax which " she did not like. She is sleeping well.      She has osteopenia with a DEXA on 12/2021 but does not have the results today. She is not on treatment.      She lives alone and her daughter is involved with her care. She exercises regularly with PT. She eats healthy.      Colonoscopy---refused   Mammogram----12/2021---wants every 2 years   DEXA-----12/2021 (don't have the results)  Tdap---unknown  Influenza vaccine---none  Pneumovax 23----refused  Prevnar 13----10/2011  Shingrex vaccine-----refused   Covid vaccine---none     Review of Systems   Constitutional:  Negative for appetite change, fatigue, fever and unexpected weight change.   HENT:  Negative for congestion, ear pain, hearing loss, sore throat and trouble swallowing.    Eyes:  Negative for pain and visual disturbance.   Respiratory:  Negative for cough, chest tightness, shortness of breath and wheezing.    Cardiovascular:  Positive for chest pain and leg swelling. Negative for palpitations.   Gastrointestinal:  Positive for constipation. Negative for abdominal pain, blood in stool, diarrhea, nausea and vomiting.   Endocrine: Negative for polyuria.   Genitourinary:  Negative for dysuria and hematuria.   Musculoskeletal:  Positive for arthralgias and myalgias. Negative for back pain.   Skin:  Negative for rash.   Neurological:  Negative for dizziness, weakness, numbness and headaches.   Hematological:  Does not bruise/bleed easily.   Psychiatric/Behavioral:  Negative for dysphoric mood, sleep disturbance and suicidal ideas. The patient is nervous/anxious.      Objective:      Vitals:    10/07/22 1023   BP: 126/78   Pulse: 68   Resp: 20   Temp: 98.4 °F (36.9 °C)   SpO2: 95%   Weight: 44.8 kg (98 lb 12.3 oz)   Height: 5' (1.524 m)     Body mass index is 19.29 kg/m².  Physical Exam    General appearance: No acute distress, cooperative  Eyes: PERRL, EOMI, conjunctiva clear  Ears: normal external ear and pinna, tm clear without drainage, canals clear  Nose: Normal  mucosa without drainage  Throat: no exudates or erythema, tonsils not enlarged  Mouth: no sores or lesions, moist mucous membranes  Neck: FROM, soft, supple, no thyromegaly, no bruits  Lymph: no anterior or posterior cervical adenopathy  Heart::  Regular rate and rhythm, no murmur  Lung: Clear to ascultation bilaterally, no wheezing, no rales, no rhonchi, no distress  Abdomen: Soft, nontender, no distention, no hepatosplenomegaly, bowel sounds normal, no guarding, no rebound, no peritoneal signs  Skin: no rashes, no lesions  Extremities: no edema, no cyanosis  Neuro: CN 2-12 intact, 5/5 muscle strength upper and lower extremity bilaterally, 2+ DTRs UE and LE bilaterally, unsteady gait   Peripheral pulses: 2+ pedal pulses bilaterally  Musculoskeletal: FROM, good strenth, no tenderness  Joint: normal appearance, no swelling, no warmth, no deformity in all joints    Assessment:       1. Paroxysmal atrial fibrillation    2. SSS (sick sinus syndrome)    3. Pacemaker    4. Venous insufficiency    5. Pulmonary emphysema, unspecified emphysema type    6. Hypothyroidism, unspecified type    7. Gastroesophageal reflux disease without esophagitis    8. Dysphagia, unspecified type    9. Fibromyalgia    10. Myalgia    11. Elevated sed rate    12. Anxiety    13. Age-related osteoporosis without current pathological fracture    14. Encounter for lipid screening for cardiovascular disease        Plan:       Paroxysmal atrial fibrillation  NSR today on exam. Due to fall risk she is anticoagulated with aspirin.  Continue to follow with cardiology.    SSS (sick sinus syndrome) s/p Pacemaker  Continue to follow with cardiology    Venous insufficiency  No fluid in lower legs on exam today.    Pulmonary emphysema, unspecified emphysema type  STable and no active symptoms.    Hypothyroidism, unspecified type  Good control on this dose of cytomel and levothyroxine.   -     TSH; Future; Expected date: 10/07/2022    Gastroesophageal reflux  disease without esophagitis  Uncontrolled and has not responded to PPI or famotidine. She does get relief from mylicon.   -     CBC Auto Differential; Future; Expected date: 10/07/2022  -     Comprehensive Metabolic Panel; Future; Expected date: 10/07/2022    Dysphagia, unspecified type  She needs EGD for dilatation and evaluation.   -     Case Request Endoscopy: ESOPHAGOGASTRODUODENOSCOPY (EGD)    Fibromyalgia  Uncontrolled and dicussed starting cymbalta. She wants to wait and think about his before starting. Continue PT since this helps with her pain.   -     Ambulatory referral/consult to Rheumatology; Future; Expected date: 10/14/2022    Myalgia with elevated inflammatory markers  Difficult to determine if due to PMR or just fibromyalgia.  The elevated markers are concerning. She tried oral steroids in the past without any response but honestly I am not sure how long she took before she had side effects and discontinued.  No signs of TA which is reassuring. I am referring to rheumatology to help me work this out and come up with a treatment plan if needed.   -     Sedimentation rate; Future; Expected date: 10/07/2022  -     C-Reactive Protein; Future; Expected date: 10/07/2022  -     Ambulatory referral/consult to Rheumatology; Future; Expected date: 10/14/2022    Anxiety  Mild and discussed that cymbalta would also help with her anxiety symptoms.    Age-related osteoporosis without current pathological fracture  Uncontrolled and no on treatment. Due to recheck DEXA in 2023.     Encounter for lipid screening for cardiovascular disease  -     Lipid Panel; Future; Expected date: 10/07/2022    Follow up in about 4 months (around 2/7/2023) for chronic medical issues.

## 2022-10-21 ENCOUNTER — TELEPHONE (OUTPATIENT)
Dept: GASTROENTEROLOGY | Facility: CLINIC | Age: 82
End: 2022-10-21
Payer: MEDICARE

## 2022-10-21 NOTE — TELEPHONE ENCOUNTER
Scheduled EGD with Dr. Junior on 12/13/22. Patient agreed to procedure date. Mailed Prep instructions to patient address.

## 2022-11-08 ENCOUNTER — TELEPHONE (OUTPATIENT)
Dept: GASTROENTEROLOGY | Facility: CLINIC | Age: 82
End: 2022-11-08
Payer: MEDICARE

## 2022-11-08 ENCOUNTER — TELEPHONE (OUTPATIENT)
Dept: FAMILY MEDICINE | Facility: CLINIC | Age: 82
End: 2022-11-08
Payer: MEDICARE

## 2022-11-08 DIAGNOSIS — R13.10 DYSPHAGIA, UNSPECIFIED TYPE: Primary | ICD-10-CM

## 2022-11-08 NOTE — TELEPHONE ENCOUNTER
Spoke with pt. Pt states she is too old to go under anesthesia & she has trouble with it. Pt canceled EGD & would like barium swallow study in stead.

## 2022-11-08 NOTE — TELEPHONE ENCOUNTER
----- Message from Zahraa Chan sent at 11/8/2022  1:53 PM CST -----  Contact: pt at [phone]  Type: Needs Medical Advice  Who Called:  pt  Best Call Back Number: [phone]  Additional Information: pt is calling the office to speak with the nurse in regards to cancelling her procedure appt on 12/13/22. Please call back to advise.

## 2022-11-08 NOTE — TELEPHONE ENCOUNTER
----- Message from Helene Ramirez sent at 11/8/2022  2:02 PM CST -----  Regarding: call back  Contact: Pt  Pt would like a call back Regarding Endoscopy    Please call and advise    Phone [phone]

## 2022-11-30 ENCOUNTER — OFFICE VISIT (OUTPATIENT)
Dept: GASTROENTEROLOGY | Facility: CLINIC | Age: 82
End: 2022-11-30
Payer: MEDICARE

## 2022-11-30 VITALS — HEIGHT: 60 IN | BODY MASS INDEX: 19.69 KG/M2 | WEIGHT: 100.31 LBS

## 2022-11-30 DIAGNOSIS — R10.13 DYSPEPSIA: ICD-10-CM

## 2022-11-30 DIAGNOSIS — Z87.19 HISTORY OF GASTRITIS: ICD-10-CM

## 2022-11-30 DIAGNOSIS — R13.19 ESOPHAGEAL DYSPHAGIA: Primary | ICD-10-CM

## 2022-11-30 DIAGNOSIS — R14.2 BELCHING: ICD-10-CM

## 2022-11-30 PROCEDURE — 99204 PR OFFICE/OUTPT VISIT, NEW, LEVL IV, 45-59 MIN: ICD-10-PCS | Mod: S$PBB,,, | Performed by: NURSE PRACTITIONER

## 2022-11-30 PROCEDURE — 99204 OFFICE O/P NEW MOD 45 MIN: CPT | Mod: S$PBB,,, | Performed by: NURSE PRACTITIONER

## 2022-11-30 PROCEDURE — 99213 OFFICE O/P EST LOW 20 MIN: CPT | Mod: PBBFAC,PO | Performed by: NURSE PRACTITIONER

## 2022-11-30 PROCEDURE — 99999 PR PBB SHADOW E&M-EST. PATIENT-LVL III: CPT | Mod: PBBFAC,,, | Performed by: NURSE PRACTITIONER

## 2022-11-30 PROCEDURE — 99999 PR PBB SHADOW E&M-EST. PATIENT-LVL III: ICD-10-PCS | Mod: PBBFAC,,, | Performed by: NURSE PRACTITIONER

## 2022-11-30 RX ORDER — DEXTROMETHORPHAN/PSEUDOEPHED 2.5-7.5/.8
40 DROPS ORAL 4 TIMES DAILY PRN
COMMUNITY

## 2022-11-30 NOTE — PROGRESS NOTES
"Subjective:       Patient ID: Carina Elise is a 82 y.o. female, Body mass index is 19.59 kg/m².    Chief Complaint: Dysphagia      Patient is new to me. Referred by Dr. Diaz for dysphagia.  Former patient of Dr. Lopez.     Gastroesophageal Reflux  She complains of belching and dysphagia (dysphagia to solids; denies trouble swallowing liquids; esophageal dilation did not improve symptoms in the past). She reports no abdominal pain, no chest pain, no choking, no coughing, no early satiety, no globus sensation, no heartburn, no hoarse voice, no nausea, no sore throat, no stridor, no tooth decay, no water brash or no wheezing. Primary symptoms also include dyspepsia- describes as "bubbles" in esophagus. This is a chronic problem. The current episode started more than 1 year ago. The problem occurs occasionally. The problem has been unchanged. The symptoms are aggravated by certain foods (eating too late at night). Pertinent negatives include no anemia, melena or weight loss. There are no known risk factors. She has tried a PPI, a histamine-2 antagonist and an antacid (Past: PPI's and H2 blockers- made symptoms worse; Currently: OTC Maalox and Mylicon PRN- helps) for the symptoms. Past procedures include an EGD and a UGI.   Review of Systems   Constitutional:  Negative for appetite change, fever, unexpected weight change and weight loss.   HENT:  Positive for trouble swallowing. Negative for hoarse voice and sore throat.    Respiratory:  Negative for cough, choking, shortness of breath and wheezing.    Cardiovascular:  Negative for chest pain.   Gastrointestinal:  Positive for constipation (chronic problem; high fiber diet and OTC stool softener PRN helps) and dysphagia (dysphagia to solids; denies trouble swallowing liquids; esophageal dilation did not improve symptoms in the past). Negative for abdominal distention, abdominal pain, anal bleeding, blood in stool, diarrhea, heartburn, melena, nausea, rectal pain and " vomiting.   Genitourinary:  Negative for difficulty urinating and dysuria.   Musculoskeletal:  Negative for gait problem.   Skin:  Negative for rash.   Neurological:  Negative for speech difficulty.   Psychiatric/Behavioral:  Negative for confusion.      Past Medical History:   Diagnosis Date    A-fib     Bronchitis     Gastritis     Pulmonary embolism       Past Surgical History:   Procedure Laterality Date    BLADDER NECK SUSPENSION      BREAST BIOPSY Right 1969    benign needle biopsy    CARDIAC ELECTROPHYSIOLOGY STUDY AND ABLATION      ESOPHAGEAL DILATION N/A 10/03/2018    Procedure: DILATION, ESOPHAGUS;  Surgeon: Kirill Lopez MD;  Location: [facility] [place] ENDO;  Service: Endoscopy;  Laterality: N/A;    ESOPHAGOGASTRODUODENOSCOPY N/A 10/03/2018    Procedure: EGD (ESOPHAGOGASTRODUODENOSCOPY);  Surgeon: Kirill Lopez MD;  Location: [facility];  Service: Endoscopy;  Laterality: N/A;    EYE SURGERY      cataract    HYSTERECTOMY      left finger      SHOULDER ARTHROSCOPY Right     TEMPOROMANDIBULAR JOINT SURGERY Bilateral     UPPER GASTROINTESTINAL ENDOSCOPY        Family History   Problem Relation Age of Onset    Heart disease Mother     Heart disease Father     Heart disease Sister     Heart disease Brother       Wt Readings from Last 10 Encounters:   11/30/22 45.5 kg (100 lb 5 oz)   10/07/22 44.8 kg (98 lb 12.3 oz)   06/06/22 45.6 kg (100 lb 8.5 oz)   05/20/22 46.1 kg (101 lb 10.1 oz)   03/28/22 49 kg (108 lb)   03/11/21 50.8 kg (112 lb)   06/03/20 50.3 kg (111 lb)   10/03/18 51.3 kg (113 lb 1.5 oz)   03/12/18 52.6 kg (115 lb 15.4 oz)   01/11/18 51.3 kg (113 lb)     Lab Results   Component Value Date    WBC 5.21 09/30/2022    HGB 13.0 09/30/2022    HCT 41.1 09/30/2022    MCV 90 09/30/2022     09/30/2022     CMP  Sodium   Date Value Ref Range Status   09/30/2022 141 136 - 145 mmol/L Final     Potassium   Date Value Ref Range Status   09/30/2022 4.5 3.5 - 5.1 mmol/L Final     Chloride   Date Value Ref Range  Status   09/30/2022 99 95 - 110 mmol/L Final     CO2   Date Value Ref Range Status   09/30/2022 32 (H) 22 - 31 mmol/L Final     Glucose   Date Value Ref Range Status   09/30/2022 92 70 - 110 mg/dL Final     Comment:     The ADA recommends the following guidelines for fasting glucose:    Normal:       less than 100 mg/dL    Prediabetes:  100 mg/dL to 125 mg/dL    Diabetes:     126 mg/dL or higher       BUN   Date Value Ref Range Status   09/30/2022 17 7 - 18 mg/dL Final     Creatinine   Date Value Ref Range Status   09/30/2022 0.66 0.50 - 1.40 mg/dL Final     Calcium   Date Value Ref Range Status   09/30/2022 9.4 8.4 - 10.2 mg/dL Final     Total Protein   Date Value Ref Range Status   09/30/2022 7.7 6.0 - 8.4 g/dL Final     Albumin   Date Value Ref Range Status   09/30/2022 4.3 3.5 - 5.2 g/dL Final     Total Bilirubin   Date Value Ref Range Status   09/30/2022 0.5 0.2 - 1.3 mg/dL Final     Alkaline Phosphatase   Date Value Ref Range Status   09/30/2022 113 38 - 145 U/L Final     AST   Date Value Ref Range Status   09/30/2022 27 14 - 36 U/L Final     ALT   Date Value Ref Range Status   09/30/2022 17 0 - 35 U/L Final     Anion Gap   Date Value Ref Range Status   09/30/2022 10 8 - 16 mmol/L Final     eGFR if    Date Value Ref Range Status   05/20/2022 >60.0 >60 mL/min/1.73 m^2 Final     eGFR if non    Date Value Ref Range Status   05/20/2022 >60.0 >60 mL/min/1.73 m^2 Final     Comment:     Calculation used to obtain the estimated glomerular filtration  rate (eGFR) is the CKD-EPI equation.        Lab Results   Component Value Date    AMYLASE 90 12/18/2018       Lab Results   Component Value Date    LIPASERES 231 12/18/2018             Reviewed prior medical records including radiology report of esophagram 12/18/18 & endoscopy history (see surgical history).     Objective:      Physical Exam  Constitutional:       General: She is not in acute distress.     Appearance: She is well-developed.    HENT:      Head: Normocephalic.      Right Ear: Hearing normal.      Left Ear: Hearing normal.      Nose: Nose normal.      Mouth/Throat:      Mouth: No oral lesions.      Pharynx: Uvula midline.   Eyes:      General: Lids are normal.      Conjunctiva/sclera: Conjunctivae normal.      Pupils: Pupils are equal, round, and reactive to light.   Neck:      Trachea: Trachea normal.   Cardiovascular:      Rate and Rhythm: Normal rate and regular rhythm.      Heart sounds: Normal heart sounds. No murmur heard.  Pulmonary:      Effort: Pulmonary effort is normal. No respiratory distress.      Breath sounds: Normal breath sounds. No stridor. No wheezing.   Abdominal:      General: Bowel sounds are normal. There is no distension.      Palpations: Abdomen is soft. There is no mass.      Tenderness: There is no abdominal tenderness. There is no guarding or rebound.   Musculoskeletal:         General: Normal range of motion.      Cervical back: Normal range of motion.   Skin:     General: Skin is warm and dry.      Findings: No rash.      Comments: Non jaundiced   Neurological:      Mental Status: She is alert and oriented to person, place, and time.   Psychiatric:         Speech: Speech normal.         Behavior: Behavior normal. Behavior is cooperative.         Assessment:       1. Esophageal dysphagia    2. Dyspepsia    3. Belching    4. History of gastritis           Plan:   All diagnoses and orders for this visit:    Esophageal dysphagia  - FL Esophagram Complete; Future; Expected date: 11/30/2022  - Recommended EGD but patient declined at this time  - Educated patient to eat smaller more frequent meals and to eat slowly and advised to eat a soft diet.    Dyspepsia, Belching & History of gastritis   - Recommended EGD but patient declined at this time   - Continue OTC Maalox and Mylicon PRN (patient hesitant to start new medication)  - Recommend to avoid large meals, avoid eating within 3 hours of bedtime, elevate head of  bed if nocturnal symptoms are present, smoking cessation (if current smoker), & weight loss (if overweight).   - Recommend minimize/avoid high-fat foods, chocolate, caffeine, citrus, alcohol, & tomato products.  - Advised to avoid/limit use of NSAID's, since they can cause GI upset, bleeding, and/or ulcers. If needed, take with food.      If no improvement in symptoms or symptoms worsen, call/follow-up at clinic or go to ER

## 2022-12-09 ENCOUNTER — TELEPHONE (OUTPATIENT)
Dept: ENDOSCOPY | Facility: HOSPITAL | Age: 82
End: 2022-12-09
Payer: MEDICARE

## 2022-12-09 VITALS — HEIGHT: 60 IN | WEIGHT: 96 LBS | BODY MASS INDEX: 18.85 KG/M2

## 2022-12-09 DIAGNOSIS — R13.10 DYSPHAGIA, UNSPECIFIED TYPE: ICD-10-CM

## 2022-12-09 DIAGNOSIS — R93.3 ABNORMAL ESOPHAGRAM: Primary | ICD-10-CM

## 2022-12-29 ENCOUNTER — TELEPHONE (OUTPATIENT)
Dept: GASTROENTEROLOGY | Facility: CLINIC | Age: 82
End: 2022-12-29
Payer: MEDICARE

## 2022-12-29 NOTE — TELEPHONE ENCOUNTER
Spoke to patient. She states that she cannot have manometry in [place] as she cannot drive and has no one to take her.

## 2022-12-29 NOTE — TELEPHONE ENCOUNTER
----- Message from Linda Spring sent at 12/29/2022 10:19 AM CST -----  Contact: self  You have the patient tamir to have a test procedure done at main campus [place] so please call back to advise if there is anyway possible to have the test done locally.  Ph # [phone] and thanks

## 2023-01-13 ENCOUNTER — TELEPHONE (OUTPATIENT)
Dept: GASTROENTEROLOGY | Facility: CLINIC | Age: 83
End: 2023-01-13
Payer: MEDICARE

## 2023-01-13 NOTE — TELEPHONE ENCOUNTER
----- Message from Iwona Garcia sent at 1/13/2023 10:36 AM CST -----  Contact: Self  Type:  Patient Returning Call    Who Called:  Patient   Who Left Message for Patient:  N/A - needs to speak to the nurse  Does the patient know what this is regarding?:  ED visit last night  Best Call Back Number:  [phone]  Additional Information:  Please call pt back ASAP, was seen at ED last night and was told to contact office to be seen right away. Thank you

## 2023-01-13 NOTE — TELEPHONE ENCOUNTER
Returned call to patient, an appointment was set up with Yahaira Deleon NP for hospital f/u, patient verbalized understanding of this.

## 2023-01-13 NOTE — TELEPHONE ENCOUNTER
----- Message from Iwona Garcia sent at 1/13/2023 10:36 AM CST -----  Contact: Self  Type:  Patient Returning Call    Who Called:  Patient   Who Left Message for Patient:  N/A - needs to speak to the nurse  Does the patient know what this is regarding?:  ED visit last night  Best Call Back Number:  [phone]  Additional Information:  Please call pt back ASAP, was seen at ED last night and was told to contact office to be seen right away. Thank you

## 2023-01-18 ENCOUNTER — OFFICE VISIT (OUTPATIENT)
Dept: GASTROENTEROLOGY | Facility: CLINIC | Age: 83
End: 2023-01-18
Payer: MEDICARE

## 2023-01-18 VITALS — HEIGHT: 60 IN | BODY MASS INDEX: 19.13 KG/M2 | WEIGHT: 97.44 LBS

## 2023-01-18 DIAGNOSIS — Z87.19 HISTORY OF GASTRITIS: ICD-10-CM

## 2023-01-18 DIAGNOSIS — K22.4 ESOPHAGEAL DYSMOTILITY: ICD-10-CM

## 2023-01-18 DIAGNOSIS — R14.2 BELCHING: ICD-10-CM

## 2023-01-18 DIAGNOSIS — R10.13 DYSPEPSIA: ICD-10-CM

## 2023-01-18 DIAGNOSIS — R13.19 ESOPHAGEAL DYSPHAGIA: Primary | ICD-10-CM

## 2023-01-18 DIAGNOSIS — R10.13 EPIGASTRIC PAIN: ICD-10-CM

## 2023-01-18 PROCEDURE — 99214 PR OFFICE/OUTPT VISIT, EST, LEVL IV, 30-39 MIN: ICD-10-PCS | Mod: S$PBB,,, | Performed by: NURSE PRACTITIONER

## 2023-01-18 PROCEDURE — 99213 OFFICE O/P EST LOW 20 MIN: CPT | Mod: PBBFAC,PO | Performed by: NURSE PRACTITIONER

## 2023-01-18 PROCEDURE — 99214 OFFICE O/P EST MOD 30 MIN: CPT | Mod: S$PBB,,, | Performed by: NURSE PRACTITIONER

## 2023-01-18 PROCEDURE — 99999 PR PBB SHADOW E&M-EST. PATIENT-LVL III: ICD-10-PCS | Mod: PBBFAC,,, | Performed by: NURSE PRACTITIONER

## 2023-01-18 PROCEDURE — 99999 PR PBB SHADOW E&M-EST. PATIENT-LVL III: CPT | Mod: PBBFAC,,, | Performed by: NURSE PRACTITIONER

## 2023-01-18 RX ORDER — FAMOTIDINE 40 MG/1
40 TABLET, FILM COATED ORAL DAILY
Qty: 90 TABLET | Refills: 3 | Status: SHIPPED | OUTPATIENT
Start: 2023-01-18 | End: 2024-01-13

## 2023-01-18 RX ORDER — METOPROLOL TARTRATE 25 MG/1
25 TABLET, FILM COATED ORAL
COMMUNITY
Start: 2023-01-09 | End: 2023-02-14

## 2023-01-18 NOTE — PROGRESS NOTES
"Subjective:       Patient ID: Carina Elise is a 82 y.o. female, Body mass index is 19.03 kg/m².    Chief Complaint: Follow-up      Established patient of myself. Referred by Dr. Gonzáles for dysphagia.     Reviewed ED discharge summary from 1/12/23: "82-year-old female with history of atrial fibrillation, gastritis, and prior pulmonary embolism who presents due to a foreign body sensation in her throat.  Of note patient with history of chronic difficulty swallowing.  History provided by patient.  Symptoms acute in onset around 5:00 p.m. today.  Patient was supposed to have an EGD in the outpatient setting but could not tolerate sedation as result she did do a barium esophagram but has not been able to go numerous to have this test done.  Patient currently awaiting results.  As previously noted this ongoing chronic intermittent issue.  Patient typically as sensation food being stuck in throat but notes that it passes after some time.  Typically she can take some simethicone but notes tonight she would shortness of breath which was abnormal for her prior episodes as result she decided to come in for evaluation.  Differentials include esophageal food impaction, ACS, PE, pneumonia, pathology.  Patient did not endorse any vomiting to suggest Lali-Marie tear with esophageal perforation.  Will obtain basic blood work, EKG, chest x-ray and re-evaluate."     Gastroesophageal Reflux  She complains of abdominal pain (epigastric region; describes as gas), belching, choking (denies having the heimlich maneuver performed), dysphagia (Chief complaint; dysphagia to solids; denies trouble swallowing liquids; administration of glucagon in ED relieved patient's symptoms) and heartburn (dyspepsia). She reports no chest pain, no coughing, no early satiety, no globus sensation, no hoarse voice, no nausea, no sore throat, no stridor, no tooth decay, no water brash or no wheezing. This is a chronic problem. The current episode started " more than 1 year ago. The problem occurs occasionally. The problem has been gradually worsening. The symptoms are aggravated by certain foods (eating too late at night). Pertinent negatives include no anemia, melena or weight loss. There are no known risk factors. She has tried a PPI, an antacid and a diet change (Past: all PPI's made symptoms worse; Currently: OTC Simethicone and Tums PRN- help) for the symptoms. Past procedures include an EGD and a UGI (showed esophageal dysmotility- recommended esophageal manometry but patient has no transportation to [place]).   Review of Systems   Constitutional:  Negative for appetite change, fever, unexpected weight change and weight loss.   HENT:  Positive for trouble swallowing. Negative for hoarse voice and sore throat.    Respiratory:  Positive for choking (denies having the heimlich maneuver performed). Negative for cough, shortness of breath and wheezing.    Cardiovascular:  Negative for chest pain.   Gastrointestinal:  Positive for abdominal pain (epigastric region; describes as gas), dysphagia (Chief complaint; dysphagia to solids; denies trouble swallowing liquids; administration of glucagon in ED relieved patient's symptoms) and heartburn (dyspepsia). Negative for abdominal distention, anal bleeding, blood in stool, constipation, diarrhea, melena, nausea, rectal pain and vomiting.   Genitourinary:  Negative for difficulty urinating and dysuria.   Musculoskeletal:  Negative for gait problem.   Skin:  Negative for rash.   Neurological:  Negative for speech difficulty.   Psychiatric/Behavioral:  Negative for confusion.      Past Medical History:   Diagnosis Date    A-fib     Bronchitis     Gastritis     Pulmonary embolism       Past Surgical History:   Procedure Laterality Date    BLADDER NECK SUSPENSION      BREAST BIOPSY Right 1969    benign needle biopsy    CARDIAC ELECTROPHYSIOLOGY STUDY AND ABLATION      ESOPHAGEAL DILATION N/A 10/03/2018    Procedure:  DILATION, ESOPHAGUS;  Surgeon: Kirill Lopez MD;  Location: [facility] ENDO;  Service: Endoscopy;  Laterality: N/A;    ESOPHAGOGASTRODUODENOSCOPY N/A 10/03/2018    Procedure: EGD (ESOPHAGOGASTRODUODENOSCOPY);  Surgeon: Kirill Lopez MD;  Location: [facility] ENDO;  Service: Endoscopy;  Laterality: N/A;    EYE SURGERY      cataract    HYSTERECTOMY      left finger      SHOULDER ARTHROSCOPY Right     TEMPOROMANDIBULAR JOINT SURGERY Bilateral     UPPER GASTROINTESTINAL ENDOSCOPY        Family History   Problem Relation Age of Onset    Heart disease Mother     Heart disease Father     Heart disease Sister     Heart disease Brother       Wt Readings from Last 10 Encounters:   01/18/23 44.2 kg (97 lb 7.1 oz)   12/09/22 43.5 kg (96 lb)   11/30/22 45.5 kg (100 lb 5 oz)   10/07/22 44.8 kg (98 lb 12.3 oz)   06/06/22 45.6 kg (100 lb 8.5 oz)   05/20/22 46.1 kg (101 lb 10.1 oz)   03/28/22 49 kg (108 lb)   03/11/21 50.8 kg (112 lb)   06/03/20 50.3 kg (111 lb)   10/03/18 51.3 kg (113 lb 1.5 oz)     Lab Results   Component Value Date    WBC 5.74 01/12/2023    HGB 13.2 01/12/2023    HCT 41.0 01/12/2023    MCV 91 01/12/2023     01/12/2023     CMP  Sodium   Date Value Ref Range Status   01/12/2023 141 136 - 145 mmol/L Final     Potassium   Date Value Ref Range Status   01/12/2023 4.1 3.5 - 5.1 mmol/L Final     Chloride   Date Value Ref Range Status   01/12/2023 103 95 - 110 mmol/L Final     CO2   Date Value Ref Range Status   01/12/2023 32 (H) 22 - 31 mmol/L Final     Glucose   Date Value Ref Range Status   01/12/2023 93 70 - 110 mg/dL Final     Comment:     The ADA recommends the following guidelines for fasting glucose:    Normal:       less than 100 mg/dL    Prediabetes:  100 mg/dL to 125 mg/dL    Diabetes:     126 mg/dL or higher       BUN   Date Value Ref Range Status   01/12/2023 22 (H) 7 - 18 mg/dL Final     Creatinine   Date Value Ref Range Status   01/12/2023 0.73 0.50 - 1.40 mg/dL Final     Calcium   Date Value Ref  Range Status   01/12/2023 9.3 8.4 - 10.2 mg/dL Final     Total Protein   Date Value Ref Range Status   01/12/2023 8.2 6.0 - 8.4 g/dL Final     Albumin   Date Value Ref Range Status   01/12/2023 4.5 3.5 - 5.2 g/dL Final     Total Bilirubin   Date Value Ref Range Status   01/12/2023 0.3 0.2 - 1.3 mg/dL Final     Alkaline Phosphatase   Date Value Ref Range Status   01/12/2023 88 38 - 145 U/L Final     AST   Date Value Ref Range Status   01/12/2023 30 14 - 36 U/L Final     ALT   Date Value Ref Range Status   01/12/2023 20 0 - 35 U/L Final     Anion Gap   Date Value Ref Range Status   01/12/2023 6 (L) 8 - 16 mmol/L Final     eGFR if    Date Value Ref Range Status   05/20/2022 >60.0 >60 mL/min/1.73 m^2 Final     eGFR if non    Date Value Ref Range Status   05/20/2022 >60.0 >60 mL/min/1.73 m^2 Final     Comment:     Calculation used to obtain the estimated glomerular filtration  rate (eGFR) is the CKD-EPI equation.        Lab Results   Component Value Date    AMYLASE 90 12/18/2018       Lab Results   Component Value Date    LIPASERES 231 12/18/2018             Reviewed prior medical records including radiology report of Esophagram 12/8/22 & endoscopy history (see surgical history).     Objective:      Physical Exam  Constitutional:       General: She is not in acute distress.     Appearance: She is well-developed.   HENT:      Head: Normocephalic.      Right Ear: Hearing normal.      Left Ear: Hearing normal.      Nose: Nose normal.      Mouth/Throat:      Mouth: No oral lesions.      Pharynx: Uvula midline.   Eyes:      General: Lids are normal.      Conjunctiva/sclera: Conjunctivae normal.      Pupils: Pupils are equal, round, and reactive to light.   Neck:      Trachea: Trachea normal.   Cardiovascular:      Rate and Rhythm: Normal rate and regular rhythm.      Heart sounds: Normal heart sounds. No murmur heard.  Pulmonary:      Effort: Pulmonary effort is normal. No respiratory distress.       Breath sounds: Normal breath sounds. No stridor. No wheezing.   Abdominal:      General: Bowel sounds are normal. There is no distension.      Palpations: Abdomen is soft. There is no mass.      Tenderness: There is abdominal tenderness in the epigastric area. There is no guarding or rebound.   Musculoskeletal:         General: Normal range of motion.      Cervical back: Normal range of motion.   Skin:     General: Skin is warm and dry.      Findings: No rash.      Comments: Non jaundiced   Neurological:      Mental Status: She is alert and oriented to person, place, and time.   Psychiatric:         Speech: Speech normal.         Behavior: Behavior normal. Behavior is cooperative.         Assessment:       1. Esophageal dysphagia    2. Esophageal dysmotility    3. Dyspepsia    4. Belching    5. Epigastric pain    6. History of gastritis           Plan:   All diagnoses and orders for this visit:    Esophageal dysphagia & Esophageal dysmotility   - Schedule EGD with possible esophageal dilation if indicated  - Educated patient to eat smaller more frequent meals and to eat slowly and advised to eat a soft diet.   - Recommended esophageal manometry but patient declined at this time (no transportation to procedure)    Dyspepsia, Belching, Epigastric pain & History of gastritis   - Start: famotidine (PEPCID) 40 MG tablet; Take 1 tablet (40 mg total) by mouth once daily.  Dispense: 90 tablet; Refill: 3  - Schedule EGD         - Recommend to avoid large meals, avoid eating within 3 hours of bedtime, elevate head of bed if nocturnal symptoms are present, smoking cessation (if current smoker), & weight loss (if overweight).   - Recommend minimize/avoid high-fat foods, chocolate, caffeine, citrus, alcohol, & tomato products.  - Advised to avoid/limit use of NSAID's, since they can cause GI upset, bleeding, and/or ulcers. If needed, take with food.      If no improvement in symptoms or symptoms worsen, call/follow-up at  clinic or go to ER

## 2023-01-27 ENCOUNTER — HOSPITAL ENCOUNTER (OUTPATIENT)
Dept: RADIOLOGY | Facility: HOSPITAL | Age: 83
Discharge: HOME | End: 2023-01-27
Attending: NURSE PRACTITIONER
Payer: MEDICARE

## 2023-01-27 DIAGNOSIS — R10.13 EPIGASTRIC PAIN: ICD-10-CM

## 2023-01-27 PROCEDURE — 76700 US EXAM ABDOM COMPLETE: CPT | Mod: TC,PO

## 2023-01-27 PROCEDURE — 76700 US ABDOMEN COMPLETE: ICD-10-PCS | Mod: 26,,, | Performed by: RADIOLOGY

## 2023-01-27 PROCEDURE — 76700 US EXAM ABDOM COMPLETE: CPT | Mod: 26,,, | Performed by: RADIOLOGY

## 2023-02-07 ENCOUNTER — ANESTHESIA EVENT (OUTPATIENT)
Dept: ENDOSCOPY | Facility: HOSPITAL | Age: 83
End: 2023-02-07
Payer: MEDICARE

## 2023-02-07 ENCOUNTER — ANESTHESIA (OUTPATIENT)
Dept: ENDOSCOPY | Facility: HOSPITAL | Age: 83
End: 2023-02-07
Payer: MEDICARE

## 2023-02-07 ENCOUNTER — HOSPITAL ENCOUNTER (OUTPATIENT)
Facility: HOSPITAL | Age: 83
Discharge: HOME | End: 2023-02-07
Attending: INTERNAL MEDICINE | Admitting: INTERNAL MEDICINE
Payer: MEDICARE

## 2023-02-07 DIAGNOSIS — R13.10 DYSPHAGIA: ICD-10-CM

## 2023-02-07 PROCEDURE — D9220A PRA ANESTHESIA: ICD-10-PCS | Mod: ANES,,, | Performed by: ANESTHESIOLOGY

## 2023-02-07 PROCEDURE — C1769 GUIDE WIRE: HCPCS | Mod: PO | Performed by: INTERNAL MEDICINE

## 2023-02-07 PROCEDURE — 37000009 HC ANESTHESIA EA ADD 15 MINS: Mod: PO | Performed by: INTERNAL MEDICINE

## 2023-02-07 PROCEDURE — 43248 EGD GUIDE WIRE INSERTION: CPT | Mod: PO | Performed by: INTERNAL MEDICINE

## 2023-02-07 PROCEDURE — 88305 TISSUE EXAM BY PATHOLOGIST: CPT | Performed by: STUDENT IN AN ORGANIZED HEALTH CARE EDUCATION/TRAINING PROGRAM

## 2023-02-07 PROCEDURE — D9220A PRA ANESTHESIA: ICD-10-PCS | Mod: CRNA,,, | Performed by: NURSE ANESTHETIST, CERTIFIED REGISTERED

## 2023-02-07 PROCEDURE — 37000008 HC ANESTHESIA 1ST 15 MINUTES: Mod: PO | Performed by: INTERNAL MEDICINE

## 2023-02-07 PROCEDURE — 43239 EGD BIOPSY SINGLE/MULTIPLE: CPT | Mod: 59,,, | Performed by: INTERNAL MEDICINE

## 2023-02-07 PROCEDURE — 63600175 PHARM REV CODE 636 W HCPCS: Mod: PO | Performed by: NURSE ANESTHETIST, CERTIFIED REGISTERED

## 2023-02-07 PROCEDURE — 88305 TISSUE EXAM BY PATHOLOGIST: CPT | Mod: 26,,, | Performed by: STUDENT IN AN ORGANIZED HEALTH CARE EDUCATION/TRAINING PROGRAM

## 2023-02-07 PROCEDURE — 43239 EGD BIOPSY SINGLE/MULTIPLE: CPT | Mod: 59,PO | Performed by: INTERNAL MEDICINE

## 2023-02-07 PROCEDURE — D9220A PRA ANESTHESIA: Mod: CRNA,,, | Performed by: NURSE ANESTHETIST, CERTIFIED REGISTERED

## 2023-02-07 PROCEDURE — 43248 EGD GUIDE WIRE INSERTION: CPT | Mod: ,,, | Performed by: INTERNAL MEDICINE

## 2023-02-07 PROCEDURE — 88305 TISSUE EXAM BY PATHOLOGIST: ICD-10-PCS | Mod: 26,,, | Performed by: STUDENT IN AN ORGANIZED HEALTH CARE EDUCATION/TRAINING PROGRAM

## 2023-02-07 PROCEDURE — 43239 PR EGD, FLEX, W/BIOPSY, SGL/MULTI: ICD-10-PCS | Mod: 59,,, | Performed by: INTERNAL MEDICINE

## 2023-02-07 PROCEDURE — 43248 PR EGD, FLEX, W/DILATION OVER GUIDEWIRE: ICD-10-PCS | Mod: ,,, | Performed by: INTERNAL MEDICINE

## 2023-02-07 PROCEDURE — D9220A PRA ANESTHESIA: Mod: ANES,,, | Performed by: ANESTHESIOLOGY

## 2023-02-07 PROCEDURE — 63600175 PHARM REV CODE 636 W HCPCS: Mod: PO | Performed by: INTERNAL MEDICINE

## 2023-02-07 PROCEDURE — 27201012 HC FORCEPS, HOT/COLD, DISP: Mod: PO | Performed by: INTERNAL MEDICINE

## 2023-02-07 RX ORDER — PROPOFOL 10 MG/ML
VIAL (ML) INTRAVENOUS
Status: DISCONTINUED | OUTPATIENT
Start: 2023-02-07 | End: 2023-02-07

## 2023-02-07 RX ORDER — SODIUM CHLORIDE, SODIUM LACTATE, POTASSIUM CHLORIDE, CALCIUM CHLORIDE 600; 310; 30; 20 MG/100ML; MG/100ML; MG/100ML; MG/100ML
INJECTION, SOLUTION INTRAVENOUS CONTINUOUS
Status: DISCONTINUED | OUTPATIENT
Start: 2023-02-07 | End: 2023-02-07 | Stop reason: HOSPADM

## 2023-02-07 RX ORDER — SUCRALFATE 1 G/1
1 TABLET ORAL 3 TIMES DAILY
Qty: 100 TABLET | Refills: 2 | Status: SHIPPED | OUTPATIENT
Start: 2023-02-07 | End: 2023-03-09

## 2023-02-07 RX ORDER — SODIUM CHLORIDE 0.9 % (FLUSH) 0.9 %
10 SYRINGE (ML) INJECTION
Status: DISCONTINUED | OUTPATIENT
Start: 2023-02-07 | End: 2023-02-07 | Stop reason: HOSPADM

## 2023-02-07 RX ADMIN — PROPOFOL 30 MG: 10 INJECTION, EMULSION INTRAVENOUS at 09:02

## 2023-02-07 RX ADMIN — PROPOFOL 90 MG: 10 INJECTION, EMULSION INTRAVENOUS at 09:02

## 2023-02-07 RX ADMIN — SODIUM CHLORIDE, POTASSIUM CHLORIDE, SODIUM LACTATE AND CALCIUM CHLORIDE: 600; 310; 30; 20 INJECTION, SOLUTION INTRAVENOUS at 08:02

## 2023-02-07 NOTE — TRANSFER OF CARE
Anesthesia Transfer of Care Note    Patient: Carina Elise    Procedure(s) Performed: Procedure(s) (LRB):  EGD (ESOPHAGOGASTRODUODENOSCOPY) (N/A)    Patient location: PACU    Anesthesia Type: general    Transport from OR: Transported from OR on room air with adequate spontaneous ventilation    Post pain: adequate analgesia    Post assessment: no apparent anesthetic complications and tolerated procedure well    Post vital signs: stable    Level of consciousness: awake and alert    Nausea/Vomiting: no nausea/vomiting    Complications: none    Transfer of care protocol was followed      Last vitals:   Visit Vitals  BP (!) 166/73   Pulse 68   Temp 36.8 °C (98.2 °F)   Resp 16   Ht 5' (1.524 m)   Wt 42.6 kg (94 lb)   SpO2 97%   Breastfeeding No   BMI 18.36 kg/m²

## 2023-02-07 NOTE — ANESTHESIA PREPROCEDURE EVALUATION
02/07/2023  Carina Elise is a 82 y.o., female.      Pre-op Assessment    I have reviewed the Patient Summary Reports.     I have reviewed the Nursing Notes. I have reviewed the NPO Status.   I have reviewed the Medications.     Review of Systems  Cardiovascular:   Dysrhythmias atrial fibrillation ECG has been reviewed. PAF   Pulmonary:   COPD    Hepatic/GI:   GERD    Neurological:  Neurology Normal    Endocrine:   Hypothyroidism        Physical Exam  General: Well nourished    Airway:  Mallampati: II   Neck ROM: Normal ROM    Dental:  Intact    Heart:  Rate: Normal  Rhythm: Regular Rhythm  Sounds: Normal        Anesthesia Plan  Type of Anesthesia, risks & benefits discussed:    Anesthesia Type: Gen Natural Airway  Intra-op Monitoring Plan: Standard ASA Monitors  Induction:  IV  Informed Consent: Informed consent signed with the Patient and all parties understand the risks and agree with anesthesia plan.  All questions answered.   ASA Score: 3    Ready For Surgery From Anesthesia Perspective.     .

## 2023-02-07 NOTE — DISCHARGE SUMMARY
[facility] - Endoscopy  Discharge Note  Short Stay  Discharge Note  Short Stay      SUMMARY     Admit Date: 2/7/2023    Attending Physician: Irineo Man MD     Discharge Physician: Irineo Man MD    Discharge Date: 2/7/2023 9:39 AM    Final Diagnosis: Dysphagia, unspecified type [R13.10]  Dyspepsia [R10.13]  History of gastritis [Z87.19]    Disposition: HOME OR SELF CARE    Patient Instructions:   Current Discharge Medication List        START taking these medications    Details   sucralfate (CARAFATE) 1 gram tablet Take 1 tablet (1 g total) by mouth 3 (three) times daily.  Qty: 100 tablet, Refills: 2           CONTINUE these medications which have NOT CHANGED    Details   aspirin (ECOTRIN) 81 MG EC tablet Take 81 mg by mouth once daily.      !! levothyroxine (SYNTHROID) 112 MCG tablet Take 1 tablet (112 mcg total) by mouth once daily.  Qty: 90 tablet, Refills: 3      !! levothyroxine (SYNTHROID) 112 MCG tablet One table daily.  NAME BRAND NECESSARY  Qty: 30 tablet, Refills: 0      liothyronine (CYTOMEL) 5 MCG Tab Take 1 tablet (5 mcg total) by mouth once daily.  Qty: 90 tablet, Refills: 2      LYSINE ORAL Take 500 mg by mouth once daily.      metoprolol succinate (TOPROL-XL) 25 MG 24 hr tablet Take 12.5 mg by mouth once daily. Take half tab daily      propafenone (RHTHYMOL) 150 MG Tab Take 150 mg by mouth 2 (two) times daily.      simethicone (MYLICON) 40 mg/0.6 mL drops Take 40 mg by mouth 4 (four) times daily as needed.      aluminum-magnesium hydroxide-simethicone (MAALOX) 200-200-20 mg/5 mL Susp Take 30 mLs by mouth 4 (four) times daily before meals and nightly.      calcium carbonate (OS-TYRELL) 500 mg calcium (1,250 mg) chewable tablet Take 1 tablet by mouth once daily.      famotidine (PEPCID) 40 MG tablet Take 1 tablet (40 mg total) by mouth once daily.  Qty: 90 tablet, Refills: 3    Associated Diagnoses: Dyspepsia; Belching      ketoconazole (NIZORAL) 2 % shampoo Apply topically twice a  week.  Qty: 120 mL, Refills: 6    Associated Diagnoses: Psoriasis      levalbuterol (XOPENEX) 1.25 mg/3 mL nebulizer solution Take 3 mLs (1.25 mg total) by nebulization every 4 (four) hours as needed for Wheezing. Rescue  Qty: 1080 mL, Refills: 5    Comments: Dispense one box  Associated Diagnoses: Pulmonary emphysema, unspecified emphysema type      metoprolol tartrate (LOPRESSOR) 25 MG tablet Take 25 mg by mouth.      vitamin D (VITAMIN D3) 1000 units Tab Take 1,000 Units by mouth.       !! - Potential duplicate medications found. Please discuss with provider.          Discharge Procedure Orders (must include Diet, Follow-up, Activity)    Follow Up:  Follow up with PCP as previously scheduled  Resume routine diet.  Activity as tolerated.    No driving day of procedure.

## 2023-02-07 NOTE — H&P
History & Physical - Short Stay  Gastroenterology      SUBJECTIVE:     Procedure: EGD    Chief Complaint/Indication for Procedure: Dysphagia    PTA Medications   Medication Sig    aspirin (ECOTRIN) 81 MG EC tablet Take 81 mg by mouth once daily.    levothyroxine (SYNTHROID) 112 MCG tablet Take 1 tablet (112 mcg total) by mouth once daily.    levothyroxine (SYNTHROID) 112 MCG tablet One table daily.  NAME BRAND NECESSARY    liothyronine (CYTOMEL) 5 MCG Tab Take 1 tablet (5 mcg total) by mouth once daily.    LYSINE ORAL Take 500 mg by mouth once daily.    metoprolol succinate (TOPROL-XL) 25 MG 24 hr tablet Take 12.5 mg by mouth once daily. Take half tab daily    propafenone (RHTHYMOL) 150 MG Tab Take 150 mg by mouth 2 (two) times daily.    simethicone (MYLICON) 40 mg/0.6 mL drops Take 40 mg by mouth 4 (four) times daily as needed.    aluminum-magnesium hydroxide-simethicone (MAALOX) 200-200-20 mg/5 mL Susp Take 30 mLs by mouth 4 (four) times daily before meals and nightly.    calcium carbonate (OS-TYRELL) 500 mg calcium (1,250 mg) chewable tablet Take 1 tablet by mouth once daily.    famotidine (PEPCID) 40 MG tablet Take 1 tablet (40 mg total) by mouth once daily.    ketoconazole (NIZORAL) 2 % shampoo Apply topically twice a week.    levalbuterol (XOPENEX) 1.25 mg/3 mL nebulizer solution Take 3 mLs (1.25 mg total) by nebulization every 4 (four) hours as needed for Wheezing. Rescue    metoprolol tartrate (LOPRESSOR) 25 MG tablet Take 25 mg by mouth.    vitamin D (VITAMIN D3) 1000 units Tab Take 1,000 Units by mouth.       Review of patient's allergies indicates:   Allergen Reactions    Acetazolamide     Adhesive     Albuterol     Antihistamines - alkylamine     Antivert [meclizine]     Azithromycin     Benadryl allergy decongestant     Benzodiazepines     Calcium channel blocking agent diltiazem analogues     Cardizem [diltiazem hcl] Other (See Comments)     High bp    Chlorzoxazone     Cholestyramine     Ciprofloxacin      Clindamycin      Mult others scanned in    Corticosteroids (glucocorticoids)      Burning sensation to area on application  Burning sensation to area on application      Diamox (sodium)     Diazepam     Diphenhydramine hcl     Docusate calcium     Doxycycline     Elavil [amitriptyline]     Epinephrine     Erythromycin base     Estradiol     Hydralazine     Hydrochlorothiazide     Levaquin [levofloxacin] Other (See Comments)     Sees black spots, spinning    Lidocaine      Pt unable to recall reaction    Medrol [methylprednisolone]     Medroxyprogesterone     Nefazodone     Nitrofurantoin     Nitrofurantoin monohyd/m-cryst     Omeprazole     Opioids - morphine analogues     Oxycodone     Paroxetine     Paroxetine hcl     Pcn [penicillins] Hives    Percocet [oxycodone-acetaminophen]     Prednisone     Propoxyphene     Raloxifene     Sertraline     Tessalon [benzonatate]     Tetanus and diphtheria toxoids     Tetanus vaccines and toxoid Swelling    Tetracyclines     Topiramate     Tramadol     Verapamil     Zolpidem     Asa [aspirin] Palpitations    Codeine Rash    Darvocet a500 [propoxyphene n-acetaminophen] Rash    Demerol [meperidine] Rash    Iodinated contrast media Palpitations     Racing heart, uncontrollable shakes    Morphine Rash    Sulfa (sulfonamide antibiotics) Rash        Past Medical History:   Diagnosis Date    A-fib     Bronchitis     Gastritis     Pulmonary embolism      Past Surgical History:   Procedure Laterality Date    BLADDER NECK SUSPENSION      BREAST BIOPSY Right 1969    benign needle biopsy    CARDIAC ELECTROPHYSIOLOGY STUDY AND ABLATION      ESOPHAGEAL DILATION N/A 10/03/2018    Procedure: DILATION, ESOPHAGUS;  Surgeon: Kirill Lopez MD;  Location: [facility];  Service: Endoscopy;  Laterality: N/A;    ESOPHAGOGASTRODUODENOSCOPY N/A 10/03/2018    Procedure: EGD (ESOPHAGOGASTRODUODENOSCOPY);  Surgeon: Kirill Lopez MD;  Location: [facility];  Service: Endoscopy;  Laterality: N/A;     EYE SURGERY      cataract    HYSTERECTOMY      left finger      SHOULDER ARTHROSCOPY Right     TEMPOROMANDIBULAR JOINT SURGERY Bilateral     UPPER GASTROINTESTINAL ENDOSCOPY       Family History   Problem Relation Age of Onset    Heart disease Mother     Heart disease Father     Heart disease Sister     Heart disease Brother      Social History     Tobacco Use    Smoking status: Never    Smokeless tobacco: Never   Substance Use Topics    Alcohol use: No    Drug use: No         OBJECTIVE:     Vital Signs (Most Recent)  Temp: 98.2 °F (36.8 °C) (02/07/23 0844)  Pulse: 68 (02/07/23 0844)  Resp: 16 (02/07/23 0844)  BP: (!) 166/73 (02/07/23 0844)  SpO2: 97 % (02/07/23 0844)    Physical Exam:                                                       GENERAL:  Comfortable, in no acute distress.                                 HEENT EXAM:  Nonicteric.  No adenopathy.  Oropharynx is clear.               NECK:  Supple.                                                               LUNGS:  Clear.                                                               CARDIAC:  Regular rate and rhythm.  S1, S2.  No murmur.                      ABDOMEN:  Soft, positive bowel sounds, nontender.  No hepatosplenomegaly or masses.  No rebound or guarding.                                             EXTREMITIES:  No edema.     MENTAL STATUS:  Normal, alert and oriented.      ASSESSMENT/PLAN:     Assessment: Dysphagia    Plan: EGD    Anesthesia Plan: General    ASA Grade: ASA 3 - Patient with moderate systemic disease with functional limitations    MALLAMPATI SCORE:  I (soft palate, uvula, fauces, and tonsillar pillars visible)

## 2023-02-07 NOTE — [employer] PATIENT INSTRUCTIONS
Discharge Summary/Instructions after an Endoscopic Procedure  Patient Name: Carina Elise  Patient MRN: [id number]  Patient YOB: 1940 Tuesday, February 7, 2023  Irineo Man MD  Dear patient,  As a result of recent federal legislation (The Federal Cures Act), you may   receive lab or pathology results from your procedure in your [facility]   account before your physician is able to contact you. Your physician or   their representative will relay the results to you with their   recommendations at their soonest availability.  Thank you,  RESTRICTIONS:  During your procedure today, you received medications for sedation.  These   medications may affect your judgment, balance and coordination.  Therefore,   for 24 hours, you have the following restrictions:   - DO NOT drive a car, operate machinery, make legal/financial decisions,   sign important papers or drink alcohol.    ACTIVITY:  Today: no heavy lifting, straining or running due to procedural   sedation/anesthesia.  The following day: return to full activity including work.  DIET:  Eat and drink normally unless instructed otherwise.     TREATMENT FOR COMMON SIDE EFFECTS:  - Mild abdominal pain, nausea, belching, bloating or excessive gas:  rest,   eat lightly and use a heating pad.  - Sore Throat: treat with throat lozenges and/or gargle with warm salt   water.  - Because air was used during the procedure, expelling large amounts of air   from your rectum or belching is normal.  - If a bowel prep was taken, you may not have a bowel movement for 1-3 days.    This is normal.  SYMPTOMS TO WATCH FOR AND REPORT TO YOUR PHYSICIAN:  1. Abdominal pain or bloating, other than gas cramps.  2. Chest pain.  3. Back pain.  4. Signs of infection such as: chills or fever occurring within 24 hours   after the procedure.  5. Rectal bleeding, which would show as bright red, maroon, or black stools.   (A tablespoon of blood from the rectum is not serious, especially  if   hemorrhoids are present.)  6. Vomiting.  7. Weakness or dizziness.  GO DIRECTLY TO THE NEAREST EMERGENCY ROOM IF YOU HAVE ANY OF THE FOLLOWING:      Difficulty breathing              Chills and/or fever over 101 F   Persistent vomiting and/or vomiting blood   Severe abdominal pain   Severe chest pain   Black, tarry stools   Bleeding- more than one tablespoon   Any other symptom or condition that you feel may need urgent attention  Your doctor recommends these additional instructions:  If any biopsies were taken, your doctors clinic will contact you in 1 to 2   weeks with any results.  We are waiting for your pathology results.   Continue your present medications.   You are being discharged to home.  For questions, problems or results please call your physician - Irineo Man MD at Work:  [phone].  EMERGENCY PHONE NUMBER: [phone], LAB RESULTS: [phone]  IF A COMPLICATION OR EMERGENCY SITUATION ARISES AND YOU ARE UNABLE TO REACH   YOUR PHYSICIAN - GO DIRECTLY TO THE EMERGENCY ROOM.  ___________________________________________  Nurse Signature  ___________________________________________  Patient/Designated Responsible Party Signature  Irineo Man MD  2/7/2023 9:38:17 AM  This report has been verified and signed electronically.  Dear patient,  As a result of recent federal legislation (The Federal Cures Act), you may   receive lab or pathology results from your procedure in your [facility]   account before your physician is able to contact you. Your physician or   their representative will relay the results to you with their   recommendations at their soonest availability.  Thank you.  PROVATION

## 2023-02-07 NOTE — ANESTHESIA POSTPROCEDURE EVALUATION
Anesthesia Post Evaluation    Patient: Carina Elise    Procedure(s) Performed: Procedure(s) (LRB):  EGD (ESOPHAGOGASTRODUODENOSCOPY) (N/A)    Final Anesthesia Type: general      Patient location during evaluation: PACU  Patient participation: Yes- Able to Participate  Level of consciousness: awake and alert  Post-procedure vital signs: reviewed and stable  Pain management: adequate  Airway patency: patent    PONV status at discharge: No PONV  Anesthetic complications: no      Cardiovascular status: hemodynamically stable  Respiratory status: unassisted and room air  Hydration status: euvolemic  Follow-up not needed.          Vitals Value Taken Time   /52 02/07/23 0950     02/07/23 1357   Pulse 71 02/07/23 0950   Resp 17 02/07/23 0950   SpO2 95 % 02/07/23 0950         Event Time   Out of Recovery 09:56:00         Pain/Demetrius Score: Demetrius Score: 10 (2/7/2023  9:55 AM)

## 2023-02-08 VITALS
TEMPERATURE: 98 F | OXYGEN SATURATION: 95 % | HEIGHT: 60 IN | RESPIRATION RATE: 17 BRPM | WEIGHT: 94 LBS | HEART RATE: 71 BPM | DIASTOLIC BLOOD PRESSURE: 52 MMHG | BODY MASS INDEX: 18.46 KG/M2 | SYSTOLIC BLOOD PRESSURE: 108 MMHG

## 2023-02-10 LAB
FINAL PATHOLOGIC DIAGNOSIS: NORMAL
GROSS: NORMAL
Lab: NORMAL

## 2023-02-14 ENCOUNTER — OFFICE VISIT (OUTPATIENT)
Dept: FAMILY MEDICINE | Facility: CLINIC | Age: 83
End: 2023-02-14
Payer: MEDICARE

## 2023-02-14 VITALS
HEIGHT: 60 IN | OXYGEN SATURATION: 99 % | DIASTOLIC BLOOD PRESSURE: 60 MMHG | HEART RATE: 66 BPM | BODY MASS INDEX: 19.46 KG/M2 | WEIGHT: 99.13 LBS | TEMPERATURE: 98 F | SYSTOLIC BLOOD PRESSURE: 110 MMHG | RESPIRATION RATE: 20 BRPM

## 2023-02-14 DIAGNOSIS — J43.9 PULMONARY EMPHYSEMA, UNSPECIFIED EMPHYSEMA TYPE: ICD-10-CM

## 2023-02-14 DIAGNOSIS — M79.7 FIBROMYALGIA: ICD-10-CM

## 2023-02-14 DIAGNOSIS — I49.5 SSS (SICK SINUS SYNDROME): ICD-10-CM

## 2023-02-14 DIAGNOSIS — Z95.0 PACEMAKER: ICD-10-CM

## 2023-02-14 DIAGNOSIS — K21.9 GASTROESOPHAGEAL REFLUX DISEASE WITHOUT ESOPHAGITIS: ICD-10-CM

## 2023-02-14 DIAGNOSIS — M79.10 MYALGIA: ICD-10-CM

## 2023-02-14 DIAGNOSIS — N39.3 STRESS INCONTINENCE: ICD-10-CM

## 2023-02-14 DIAGNOSIS — Z12.31 ENCOUNTER FOR SCREENING MAMMOGRAM FOR MALIGNANT NEOPLASM OF BREAST: ICD-10-CM

## 2023-02-14 DIAGNOSIS — M81.0 AGE-RELATED OSTEOPOROSIS WITHOUT CURRENT PATHOLOGICAL FRACTURE: ICD-10-CM

## 2023-02-14 DIAGNOSIS — F41.9 ANXIETY: ICD-10-CM

## 2023-02-14 DIAGNOSIS — R63.4 WEIGHT LOSS: ICD-10-CM

## 2023-02-14 DIAGNOSIS — R13.10 DYSPHAGIA, UNSPECIFIED TYPE: ICD-10-CM

## 2023-02-14 DIAGNOSIS — I87.2 VENOUS INSUFFICIENCY: ICD-10-CM

## 2023-02-14 DIAGNOSIS — E03.9 HYPOTHYROIDISM, UNSPECIFIED TYPE: ICD-10-CM

## 2023-02-14 DIAGNOSIS — I48.0 PAROXYSMAL ATRIAL FIBRILLATION: Primary | ICD-10-CM

## 2023-02-14 DIAGNOSIS — R70.0 ELEVATED SED RATE: ICD-10-CM

## 2023-02-14 PROCEDURE — 99214 OFFICE O/P EST MOD 30 MIN: CPT | Mod: S$GLB,,, | Performed by: INTERNAL MEDICINE

## 2023-02-14 PROCEDURE — 99214 PR OFFICE/OUTPT VISIT, EST, LEVL IV, 30-39 MIN: ICD-10-PCS | Mod: S$GLB,,, | Performed by: INTERNAL MEDICINE

## 2023-02-14 RX ORDER — LEVALBUTEROL INHALATION SOLUTION 1.25 MG/3ML
1 SOLUTION RESPIRATORY (INHALATION) EVERY 4 HOURS PRN
Qty: 1080 ML | Refills: 5 | Status: SHIPPED | OUTPATIENT
Start: 2023-02-14 | End: 2024-02-14

## 2023-02-14 NOTE — PROGRESS NOTES
Subjective:       Patient ID: Carina Elise is a 82 y.o. female.    Medication List with Changes/Refills   Current Medications    ALUMINUM-MAGNESIUM HYDROXIDE-SIMETHICONE (MAALOX) 200-200-20 MG/5 ML SUSP    Take 30 mLs by mouth 4 (four) times daily before meals and nightly.    ASPIRIN (ECOTRIN) 81 MG EC TABLET    Take 81 mg by mouth once daily.    CALCIUM CARBONATE (OS-TYRELL) 500 MG CALCIUM (1,250 MG) CHEWABLE TABLET    Take 1 tablet by mouth once daily.    CALCIUM CARBONATE (TUMS ORAL)    Tums   PRN    FAMOTIDINE (PEPCID) 40 MG TABLET    Take 1 tablet (40 mg total) by mouth once daily.    KETOCONAZOLE (NIZORAL) 2 % SHAMPOO    Apply topically twice a week.    LEVOTHYROXINE (SYNTHROID) 112 MCG TABLET    Take 1 tablet (112 mcg total) by mouth once daily.    LIOTHYRONINE (CYTOMEL) 5 MCG TAB    Take 1 tablet (5 mcg total) by mouth once daily.    LYSINE ORAL    Take 500 mg by mouth once daily.    METOPROLOL SUCCINATE (TOPROL-XL) 25 MG 24 HR TABLET    Take 12.5 mg by mouth once daily. Take half tab daily    PROPAFENONE (RHTHYMOL) 150 MG TAB    Take 150 mg by mouth 2 (two) times daily.    SIMETHICONE (MYLICON) 40 MG/0.6 ML DROPS    Take 40 mg by mouth 4 (four) times daily as needed.    SUCRALFATE (CARAFATE) 1 GRAM TABLET    Take 1 tablet (1 g total) by mouth 3 (three) times daily.    VITAMIN D (VITAMIN D3) 1000 UNITS TAB    Take 1,000 Units by mouth.   Changed and/or Refilled Medications    Modified Medication Previous Medication    LEVALBUTEROL (XOPENEX) 1.25 MG/3 ML NEBULIZER SOLUTION levalbuterol (XOPENEX) 1.25 mg/3 mL nebulizer solution       Take 3 mLs (1.25 mg total) by nebulization every 4 (four) hours as needed for Wheezing. Rescue    Take 3 mLs (1.25 mg total) by nebulization every 4 (four) hours as needed for Wheezing. Rescue   Discontinued Medications    LEVOTHYROXINE (SYNTHROID) 112 MCG TABLET    One table daily.  NAME BRAND NECESSARY    METOPROLOL TARTRATE (LOPRESSOR) 25 MG TABLET    Take 25 mg by mouth. Taking  1/2 tablet one time a day       Chief Complaint: Follow-up  She is here today to f/u on chronic medical issues.      She has emphysema and is followed by pulmonary. She was last seen on 3/2021. She has chronic shortness of breath but denies wheezing or coughing. She has xopenex but had not needed to use. CXR on 1/2023 was clear with vascular calcifications.      She as paroxysmal Afib s/p RFA in 2011.  She has a pacemaker due SSS and denies any recurrent Afib since ablation. No known CAD or hypertension. She is taking rhthymol 150 mg bid  and metoprolol 12.5 mg daily. She is anticoagulated with aspirin daily. She is managed by Dr. Lao in cardiology.  She had a stress echo per patient that was reassuring. She continues to have intermittent chest pressure that is associated with indigestion.  No shortness of breath.       She has venous insufficiency with intermittent lower leg swelling. She had venous ablation surgery in the past.      She has hypothyroid and is taking levothyroxine 112 mcg daily and cytomel 5 mcg daily.  She feels this is well controlled and TSH on 5/2022 was normal.      She has acid reflux with dyspepsia and unable to take PPI due to intolerance in the past. EGD on 2/2023 was normal with biopsy showing chronic gastritis with GERD.  Esophogram on 12/2022 showed esophageal dysmotility.  She was seen by GI on 1/2023 and started on famotidine 40 mg qhs. She does feel her symptoms have improved after her EGD.     She does complain of mild stress incontinence with laughing, sneezing or bending forward.       Recall there was concern for the diagnosis of temporal arteritis due to elevated inflammatory markers, headaches and weakness.  She was noted to have jaw pain due to chewing fatigue but no claudication.  She had refused the TA biopsy in the past.  Labs showed ESR of 18 with CRP of 51.  Normal u/s of the temporal arteries.  Normal CBC, CMP and SPEP. Her headaches have resolved and she denies any  jaw pain. She continues to have diffuse muscle pain but feels it is improved with starting PT.  Her last inflammatory markers on 9/2022 were ESR 61 and CRP 2.3. she was due to recheck but did not get done. She could not tolerate steroids in the past.      She has fibromyalgia for many years that will flare with muscle aches and weakness. She is treated with regular exercise through physical therapy.      She does reports increasing anxiety but denies depression. She has had for many years and has only tried xanax which she did not like. She is sleeping well.  She feels this is stable.      She has osteopenia with a DEXA on 12/2021 but does not have the results today. She is not on treatment.      She lives alone and her daughter is involved with her care. She exercises regularly with PT. She eats healthy. Her weight has been stable since her initial weight loss last year. She is eating 3 meals a day with snacks and boost.  She is improved with her appetite and swallowing after her EGD.      Colonoscopy---refused   Mammogram----12/2021---due   DEXA-----12/2021 (don't have the results)  Tdap---unknown  Influenza vaccine---refused   Pneumovax 23----refused  Prevnar 13----10/2011  Shingrex vaccine-----refused   Covid vaccine---refused     Review of Systems   Constitutional:  Negative for appetite change, fatigue, fever and unexpected weight change.   HENT:  Negative for congestion, ear pain, hearing loss, sore throat and trouble swallowing.    Eyes:  Negative for pain and visual disturbance.   Respiratory:  Positive for cough and shortness of breath. Negative for chest tightness and wheezing.    Cardiovascular:  Negative for chest pain, palpitations and leg swelling.   Gastrointestinal:  Negative for abdominal pain, blood in stool, constipation, diarrhea, nausea and vomiting.   Endocrine: Negative for polyuria.   Genitourinary:  Positive for urgency. Negative for dysuria and hematuria.   Musculoskeletal:  Positive for  arthralgias and myalgias. Negative for back pain.   Skin:  Negative for rash.   Neurological:  Negative for dizziness, weakness, numbness and headaches.   Hematological:  Does not bruise/bleed easily.   Psychiatric/Behavioral:  Negative for dysphoric mood, sleep disturbance and suicidal ideas. The patient is nervous/anxious.      Objective:      Vitals:    02/14/23 1017   BP: 110/60   Pulse: 66   Resp: 20   Temp: 97.9 °F (36.6 °C)   SpO2: 99%   Weight: 45 kg (99 lb 1.6 oz)   Height: 5' (1.524 m)     Body mass index is 19.35 kg/m².  Physical Exam    General appearance: No acute distress, cooperative, frail appearing   Eyes: PERRL, EOMI, conjunctiva clear  Ears: normal external ear and pinna, tm clear without drainage, canals clear  Nose: Normal mucosa without drainage  Throat: no exudates or erythema, tonsils not enlarged  Mouth: no sores or lesions, moist mucous membranes  Neck: FROM, soft, supple, no thyromegaly, no bruits  Lymph: no anterior or posterior cervical adenopathy  Heart::  Regular rate and rhythm, no murmur  Lung: Clear to ascultation bilaterally, no wheezing, no rales, no rhonchi, no distress  Abdomen: Soft, nontender, no distention, no hepatosplenomegaly, bowel sounds normal, no guarding, no rebound, no peritoneal signs  Skin: no rashes, no lesions  Extremities: no edema, no cyanosis  Neuro: CN 2-12 intact, 5/5 muscle strength upper and lower extremity bilaterally, 2+ DTRs UE and LE bilaterally, unsteady gait   Peripheral pulses: 2+ pedal pulses bilaterally, good perfusion and color  Musculoskeletal: FROM, good strenth, no tenderness  Joint: normal appearance, no swelling, no warmth, no deformity in all joints    Assessment:       1. Paroxysmal atrial fibrillation    2. SSS (sick sinus syndrome)    3. Pacemaker    4. Venous insufficiency    5. Pulmonary emphysema, unspecified emphysema type    6. Hypothyroidism, unspecified type    7. Gastroesophageal reflux disease without esophagitis    8.  Dysphagia, unspecified type    9. Weight loss    10. Stress incontinence    11. Fibromyalgia    12. Myalgia    13. Elevated sed rate    14. Anxiety    15. Age-related osteoporosis without current pathological fracture    16. Encounter for screening mammogram for malignant neoplasm of breast        Plan:       Paroxysmal atrial fibrillation  NSR today and continue on metoprolol. She is anticoagulated with aspirin.     SSS (sick sinus syndrome) s/p Pacemaker  STable and continues to follow with cardiology.    Venous insufficiency  Well compensated today.    Pulmonary emphysema, unspecified emphysema type  Stable and no active symptoms. Given refill on xopenex to use as needed.   -     levalbuterol (XOPENEX) 1.25 mg/3 mL nebulizer solution; Take 3 mLs (1.25 mg total) by nebulization every 4 (four) hours as needed for Wheezing. Rescue  Dispense: 1080 mL; Refill: 5    Hypothyroidism, unspecified type  Good control on this dose of levothyroxine. She will finish name brand synthroid and then start generic levothyroxine. Will check TSH after on generic for 6 weeks.     Gastroesophageal reflux disease without esophagitis  She would benefit from PPI but has intolerance. Advised to continue famotidine nightly    Dysphagia, unspecified type  Improved after EGD. Continue to monitor    Weight loss  Due to poor po intake due to dysphagia that is now improved. She is eating better and taking supplementation with boost. Continue to monitor weight.     Stress incontinence  Mild and continue to monitor    Fibromyalgia  Uncontrolled but improved with PT.  She is unable to tolerate multiple medications.    Myalgia with Elevated sed rate  Due to repeat inflammatory markers. She was not treated with steroids due to intolerance. She is improving.     Anxiety  Mild and continue to monitor.    Age-related osteoporosis without current pathological fracture  Uncontrolled and she could not tolerate treatment.     Encounter for screening  mammogram for malignant neoplasm of breast  -     Mammo Digital Screening Bilat w/ [facility]; Future; Expected date: 02/14/2023    Follow up in about 4 months (around 6/14/2023) for chronic medical issues.

## 2023-02-16 ENCOUNTER — TELEPHONE (OUTPATIENT)
Dept: FAMILY MEDICINE | Facility: CLINIC | Age: 83
End: 2023-02-16
Payer: MEDICARE

## 2023-02-16 NOTE — TELEPHONE ENCOUNTER
----- Message from Zuleyma Magaña sent at 2/16/2023  8:20 AM CST -----  Contact: Ira from [facility]  Type:  Needs Medical Advice    Who Called: Tesha from [facility]    Would the patient rather a call back or a response via [facility]? call  Best Call Back Number:   [phone], invoice # [id number]    [facility] - [address] [address]  Phone: [phone] Fax: [phone]      Additional Information: phar states they need to verify dosage of levalbuterol (XOPENEX) 1.25 mg/3 mL nebulizer solution, usually takes 3 times a days, pt is allergic to benadryl so wanted to see if pt can tolerate the rx. Please advise and thank you.

## 2023-02-16 NOTE — TELEPHONE ENCOUNTER
Pharmacy states they need to verify dosage of levalbuterol (XOPENEX) 1.25 mg/3 mL nebulizer solution, usually takes 3 times a days, pt is allergic to benadryl so wanted to see if pt can tolerate the rx. Please advise and thank you.

## 2023-02-17 ENCOUNTER — LAB VISIT (OUTPATIENT)
Dept: LAB | Facility: HOSPITAL | Age: 83
End: 2023-02-17
Attending: INTERNAL MEDICINE
Payer: MEDICARE

## 2023-02-17 DIAGNOSIS — K21.9 GASTROESOPHAGEAL REFLUX DISEASE WITHOUT ESOPHAGITIS: ICD-10-CM

## 2023-02-17 DIAGNOSIS — Z13.6 ENCOUNTER FOR LIPID SCREENING FOR CARDIOVASCULAR DISEASE: ICD-10-CM

## 2023-02-17 DIAGNOSIS — E03.9 HYPOTHYROIDISM, UNSPECIFIED TYPE: ICD-10-CM

## 2023-02-17 DIAGNOSIS — M79.10 MYALGIA: ICD-10-CM

## 2023-02-17 DIAGNOSIS — Z13.220 ENCOUNTER FOR LIPID SCREENING FOR CARDIOVASCULAR DISEASE: ICD-10-CM

## 2023-02-17 LAB
ALBUMIN SERPL BCP-MCNC: 3.9 G/DL (ref 3.5–5.2)
ALP SERPL-CCNC: 92 U/L (ref 55–135)
ALT SERPL W/O P-5'-P-CCNC: 13 U/L (ref 10–44)
ANION GAP SERPL CALC-SCNC: 9 MMOL/L (ref 8–16)
AST SERPL-CCNC: 19 U/L (ref 10–40)
BILIRUB SERPL-MCNC: 0.4 MG/DL (ref 0.1–1)
BUN SERPL-MCNC: 18 MG/DL (ref 8–23)
CALCIUM SERPL-MCNC: 9.5 MG/DL (ref 8.7–10.5)
CHLORIDE SERPL-SCNC: 103 MMOL/L (ref 95–110)
CHOLEST SERPL-MCNC: 168 MG/DL (ref 120–199)
CHOLEST/HDLC SERPL: 2.4 {RATIO} (ref 2–5)
CO2 SERPL-SCNC: 29 MMOL/L (ref 23–29)
CREAT SERPL-MCNC: 0.8 MG/DL (ref 0.5–1.4)
CRP SERPL-MCNC: 7.1 MG/L (ref 0–8.2)
EST. GFR  (NO RACE VARIABLE): >60 ML/MIN/1.73 M^2
GLUCOSE SERPL-MCNC: 93 MG/DL (ref 70–110)
HDLC SERPL-MCNC: 69 MG/DL (ref 40–75)
HDLC SERPL: 41.1 % (ref 20–50)
LDLC SERPL CALC-MCNC: 84.2 MG/DL (ref 63–159)
NONHDLC SERPL-MCNC: 99 MG/DL
POTASSIUM SERPL-SCNC: 4.6 MMOL/L (ref 3.5–5.1)
PROT SERPL-MCNC: 7.7 G/DL (ref 6–8.4)
SODIUM SERPL-SCNC: 141 MMOL/L (ref 136–145)
TRIGL SERPL-MCNC: 74 MG/DL (ref 30–150)
TSH SERPL DL<=0.005 MIU/L-ACNC: 1.25 UIU/ML (ref 0.4–4)

## 2023-02-17 PROCEDURE — 80053 COMPREHEN METABOLIC PANEL: CPT | Performed by: INTERNAL MEDICINE

## 2023-02-17 PROCEDURE — 80061 LIPID PANEL: CPT | Performed by: INTERNAL MEDICINE

## 2023-02-17 PROCEDURE — 84443 ASSAY THYROID STIM HORMONE: CPT | Performed by: INTERNAL MEDICINE

## 2023-02-17 PROCEDURE — 85652 RBC SED RATE AUTOMATED: CPT | Performed by: INTERNAL MEDICINE

## 2023-02-17 PROCEDURE — 86140 C-REACTIVE PROTEIN: CPT | Performed by: INTERNAL MEDICINE

## 2023-02-17 PROCEDURE — 85025 COMPLETE CBC W/AUTO DIFF WBC: CPT | Performed by: INTERNAL MEDICINE

## 2023-02-17 PROCEDURE — 36415 COLL VENOUS BLD VENIPUNCTURE: CPT | Mod: PO | Performed by: INTERNAL MEDICINE

## 2023-02-18 LAB
BASOPHILS # BLD AUTO: 0.04 K/UL (ref 0–0.2)
BASOPHILS NFR BLD: 0.7 % (ref 0–1.9)
DIFFERENTIAL METHOD: ABNORMAL
EOSINOPHIL # BLD AUTO: 0.1 K/UL (ref 0–0.5)
EOSINOPHIL NFR BLD: 2.4 % (ref 0–8)
ERYTHROCYTE [DISTWIDTH] IN BLOOD BY AUTOMATED COUNT: 14.2 % (ref 11.5–14.5)
ERYTHROCYTE [SEDIMENTATION RATE] IN BLOOD BY PHOTOMETRIC METHOD: 9 MM/HR (ref 0–36)
HCT VFR BLD AUTO: 42 % (ref 37–48.5)
HGB BLD-MCNC: 13.3 G/DL (ref 12–16)
IMM GRANULOCYTES # BLD AUTO: 0.02 K/UL (ref 0–0.04)
IMM GRANULOCYTES NFR BLD AUTO: 0.4 % (ref 0–0.5)
LYMPHOCYTES # BLD AUTO: 1.5 K/UL (ref 1–4.8)
LYMPHOCYTES NFR BLD: 28 % (ref 18–48)
MCH RBC QN AUTO: 29.6 PG (ref 27–31)
MCHC RBC AUTO-ENTMCNC: 31.7 G/DL (ref 32–36)
MCV RBC AUTO: 93 FL (ref 82–98)
MONOCYTES # BLD AUTO: 0.6 K/UL (ref 0.3–1)
MONOCYTES NFR BLD: 10.1 % (ref 4–15)
NEUTROPHILS # BLD AUTO: 3.2 K/UL (ref 1.8–7.7)
NEUTROPHILS NFR BLD: 58.4 % (ref 38–73)
NRBC BLD-RTO: 0 /100 WBC
PLATELET # BLD AUTO: 242 K/UL (ref 150–450)
PMV BLD AUTO: 12.1 FL (ref 9.2–12.9)
RBC # BLD AUTO: 4.5 M/UL (ref 4–5.4)
WBC # BLD AUTO: 5.46 K/UL (ref 3.9–12.7)

## 2023-02-19 ENCOUNTER — TELEPHONE (OUTPATIENT)
Dept: FAMILY MEDICINE | Facility: CLINIC | Age: 83
End: 2023-02-19
Payer: MEDICARE

## 2023-02-19 NOTE — TELEPHONE ENCOUNTER
Please let her know that her repeat labs look great.     Her inflammatory markers are now back to normal. Normal liver,kidney and thyroid. Cholesterol is well controlled.     Thanks

## 2023-03-15 ENCOUNTER — HOSPITAL ENCOUNTER (OUTPATIENT)
Dept: RADIOLOGY | Facility: HOSPITAL | Age: 83
Discharge: HOME | End: 2023-03-15
Attending: INTERNAL MEDICINE
Payer: MEDICARE

## 2023-03-15 DIAGNOSIS — J43.2 CENTRIACINAR EMPHYSEMA: ICD-10-CM

## 2023-03-15 DIAGNOSIS — Z12.31 ENCOUNTER FOR SCREENING MAMMOGRAM FOR MALIGNANT NEOPLASM OF BREAST: ICD-10-CM

## 2023-03-15 PROCEDURE — 71046 X-RAY EXAM CHEST 2 VIEWS: CPT | Mod: 26,,, | Performed by: RADIOLOGY

## 2023-03-15 PROCEDURE — 77067 SCR MAMMO BI INCL CAD: CPT | Mod: 26,,, | Performed by: RADIOLOGY

## 2023-03-15 PROCEDURE — 71046 X-RAY EXAM CHEST 2 VIEWS: CPT | Mod: TC,FY,PO

## 2023-03-15 PROCEDURE — 71046 XR CHEST PA AND LATERAL: ICD-10-PCS | Mod: 26,,, | Performed by: RADIOLOGY

## 2023-03-15 PROCEDURE — 77063 MAMMO DIGITAL SCREENING BILAT WITH TOMO: ICD-10-PCS | Mod: 26,,, | Performed by: RADIOLOGY

## 2023-03-15 PROCEDURE — 77063 BREAST TOMOSYNTHESIS BI: CPT | Mod: 26,,, | Performed by: RADIOLOGY

## 2023-03-15 PROCEDURE — 77067 MAMMO DIGITAL SCREENING BILAT WITH TOMO: ICD-10-PCS | Mod: 26,,, | Performed by: RADIOLOGY

## 2023-03-15 PROCEDURE — 77067 SCR MAMMO BI INCL CAD: CPT | Mod: TC,PO

## 2023-04-06 ENCOUNTER — TELEPHONE (OUTPATIENT)
Dept: FAMILY MEDICINE | Facility: CLINIC | Age: 83
End: 2023-04-06
Payer: MEDICARE

## 2023-04-06 DIAGNOSIS — E03.9 HYPOTHYROIDISM, UNSPECIFIED TYPE: Primary | ICD-10-CM

## 2023-04-06 NOTE — TELEPHONE ENCOUNTER
Returned call to patient.  States she was given generic synthroid and needs repeat labs. Need order for TSH, appt is already scheduled.  Will need to be linked once entered.

## 2023-04-06 NOTE — TELEPHONE ENCOUNTER
----- Message from Mildred Siddiqi sent at 4/5/2023 10:11 AM CDT -----  Contact: pt/[phone]  Type:  Patient Call    Who Called:pt    Would the patient rather a call back or a response via [facility]? Call   Best Call Back Number:[phone]/ [phone]  Additional Information: pt  called requesting a  return  call regarding  her medication

## 2023-05-02 ENCOUNTER — TELEPHONE (OUTPATIENT)
Dept: RHEUMATOLOGY | Facility: CLINIC | Age: 83
End: 2023-05-02
Payer: MEDICARE

## 2023-05-02 NOTE — TELEPHONE ENCOUNTER
----- Message from Cece Meyer sent at 5/2/2023 11:20 AM CDT -----  Contact: self  Type: Needs Medical Advice  Who Called:  Patient    Best Call Back Number: [phone]    Additional Information: Pt states she need to speak with office regarding appt on 5/8 need to [place] to early morning due to her transportation.Please call back

## 2023-05-02 NOTE — TELEPHONE ENCOUNTER
Pt will  keep 5/8/223 1pm appointment and try to get transportation. Matthias ramiresappontment  opens.

## 2023-05-05 NOTE — PROGRESS NOTES
Subjective:      Patient ID: Carina Elise is a 82 y.o. female.    Chief Complaint: Disease Management    HPI    This is a pleasant 82 year old woman who was referred to rheumatology for joint pain and elevated inflammatory markers. Over the past year, she has had a significant amount of stress. She was diagnosed with scalp psoriasis, shingles, and fibromyalgia. She has been going for PT and dry needling which help significantly but reports widespread pain but mostly in the back and shoulders. She takes Tylenol PRN which does seem to help. Her back pain is worse with exertion. She states that she had shingles when her inflammatory markers were elevated. She denies headaches, vision changes, and jaw claudication currently.     Rheumatology History:     - Diagnosis/es:   - Psoriasis diagnosed 2022   - Fibromyalgia   - Osteopenia off medication  - Positive serologies: N/A  - Negative serologies: ALONSO  - Imaging: None  - Previous Treatments: N/A  - Current Treatments:    - Tylenol   - PT   - Dry needling   - Massage    Objective:   BP (!) 169/75   Pulse 67   Ht 5' (1.524 m)   Wt 44.1 kg (97 lb 3.6 oz)   BMI 18.99 kg/m²   Physical Exam   Constitutional: normal appearance.   HENT:   Head: Normocephalic and atraumatic.   Cardiovascular: Normal rate, regular rhythm and normal heart sounds.   Pulmonary/Chest: Effort normal and breath sounds normal.   Musculoskeletal:      Comments: + Heberden's and Kailey's nodes  Slightly reduced shoulder abduction bilaterally  + Empty can and Yergason's, left shoulder  No synovitis, dactylitis, enthesitis, effusions     Neurological: She is alert.   Skin: Skin is warm and dry. No rash noted.   No skin thickening, telangiectasias, calcinosis, psoriasiform lesions, lupoid lesions       No data to display    Labs and imaging reviewed by me:  Negative ALONSO   CBC (2/2023) WNL  CMP WNL   ESR WNL <- 61  CRP WNL <- 2.30    Assessment:     1. Polyarthralgia    2. Fibromyalgia    3. Elevated sed  rate    4. Left shoulder tendinitis    5. Osteoarthritis, unspecified osteoarthritis type, unspecified site      This is an 82 year old woman with history of psoriasis, COPD, paroxysmal atrial fibrillation s/p RFA (2011), hypothyroidism, GERD, esophageal dysmotility, fibromyalgia, osteopenia, anxiety who was referred to rheumatology for an elevated ESR. Over the past year, she was diagnosed with scalp psoriasis, shingles, and fibromyalgia. She has had polyarthralgia worst in the shoulders and back. She has been going for PT and dry needling and takes Tylenol which helps significantly. She denies headaches, vision changes, and jaw claudication currently. Physical examination shows degenerative changes and maneuvers suggestive of left shoulder tendinitis. I offered IA steroid injection but she states she is not able to take steroids.     Plan:     Problem List Items Addressed This Visit    None  Visit Diagnoses       Polyarthralgia    -  Primary    Relevant Orders    Sedimentation rate    C-Reactive Protein    X-Ray Hand Complete Bilateral    X-Ray Shoulder Complete Bilateral    Fibromyalgia        Elevated sed rate        Relevant Orders    Sedimentation rate    C-Reactive Protein    X-Ray Hand Complete Bilateral    Left shoulder tendinitis        Osteoarthritis, unspecified osteoarthritis type, unspecified site              - Voltaren gel PRN  - paraffin wax baths   - turmeric 1000 mg daily  - continue physical therapy  - Hand and shoulder Xrays  - Repeat inflammatory markers  - DEXA due 12/2023, per patient monitored by PCP    Follow up in 6 months    45 minutes of total time spent on the encounter, which includes face to face time and non-face to face time preparing to see the patient (eg, review of tests), Obtaining and/or reviewing separately obtained history, Documenting clinical information in the electronic or other health record, Independently interpreting results (not separately reported) and communicating  results to the patient/family/caregiver, or Care coordination (not separately reported).       Paulette Ross M.D.  Rheumatology Dept  [place], [place]

## 2023-05-08 ENCOUNTER — OFFICE VISIT (OUTPATIENT)
Dept: RHEUMATOLOGY | Facility: CLINIC | Age: 83
End: 2023-05-08
Payer: MEDICARE

## 2023-05-08 ENCOUNTER — HOSPITAL ENCOUNTER (OUTPATIENT)
Dept: RADIOLOGY | Facility: HOSPITAL | Age: 83
Discharge: HOME | End: 2023-05-08
Attending: STUDENT IN AN ORGANIZED HEALTH CARE EDUCATION/TRAINING PROGRAM
Payer: MEDICARE

## 2023-05-08 VITALS
SYSTOLIC BLOOD PRESSURE: 169 MMHG | HEIGHT: 60 IN | DIASTOLIC BLOOD PRESSURE: 75 MMHG | HEART RATE: 67 BPM | WEIGHT: 97.25 LBS | BODY MASS INDEX: 19.09 KG/M2

## 2023-05-08 DIAGNOSIS — M25.50 POLYARTHRALGIA: ICD-10-CM

## 2023-05-08 DIAGNOSIS — M19.90 OSTEOARTHRITIS, UNSPECIFIED OSTEOARTHRITIS TYPE, UNSPECIFIED SITE: ICD-10-CM

## 2023-05-08 DIAGNOSIS — M77.8 LEFT SHOULDER TENDINITIS: ICD-10-CM

## 2023-05-08 DIAGNOSIS — R70.0 ELEVATED SED RATE: ICD-10-CM

## 2023-05-08 DIAGNOSIS — M25.50 POLYARTHRALGIA: Primary | ICD-10-CM

## 2023-05-08 DIAGNOSIS — M79.7 FIBROMYALGIA: ICD-10-CM

## 2023-05-08 PROCEDURE — 73030 X-RAY EXAM OF SHOULDER: CPT | Mod: 26,LT,, | Performed by: RADIOLOGY

## 2023-05-08 PROCEDURE — 73030 X-RAY EXAM OF SHOULDER: CPT | Mod: TC,50,FY,PO

## 2023-05-08 PROCEDURE — 99999 PR PBB SHADOW E&M-EST. PATIENT-LVL V: CPT | Mod: PBBFAC,,, | Performed by: STUDENT IN AN ORGANIZED HEALTH CARE EDUCATION/TRAINING PROGRAM

## 2023-05-08 PROCEDURE — 99204 PR OFFICE/OUTPT VISIT, NEW, LEVL IV, 45-59 MIN: ICD-10-PCS | Mod: S$PBB,,, | Performed by: STUDENT IN AN ORGANIZED HEALTH CARE EDUCATION/TRAINING PROGRAM

## 2023-05-08 PROCEDURE — 99999 PR PBB SHADOW E&M-EST. PATIENT-LVL V: ICD-10-PCS | Mod: PBBFAC,,, | Performed by: STUDENT IN AN ORGANIZED HEALTH CARE EDUCATION/TRAINING PROGRAM

## 2023-05-08 PROCEDURE — 73030 XR SHOULDER COMPLETE 2 OR MORE VIEWS BILATERAL: ICD-10-PCS | Mod: 26,RT,, | Performed by: RADIOLOGY

## 2023-05-08 PROCEDURE — 73130 XR HAND COMPLETE 3 VIEWS BILATERAL: ICD-10-PCS | Mod: 26,50,, | Performed by: RADIOLOGY

## 2023-05-08 PROCEDURE — 73030 X-RAY EXAM OF SHOULDER: CPT | Mod: 26,RT,, | Performed by: RADIOLOGY

## 2023-05-08 PROCEDURE — 73130 X-RAY EXAM OF HAND: CPT | Mod: TC,50,FY,PO

## 2023-05-08 PROCEDURE — 99204 OFFICE O/P NEW MOD 45 MIN: CPT | Mod: S$PBB,,, | Performed by: STUDENT IN AN ORGANIZED HEALTH CARE EDUCATION/TRAINING PROGRAM

## 2023-05-08 PROCEDURE — 73130 X-RAY EXAM OF HAND: CPT | Mod: 26,50,, | Performed by: RADIOLOGY

## 2023-05-08 PROCEDURE — 99215 OFFICE O/P EST HI 40 MIN: CPT | Mod: PBBFAC,PN | Performed by: STUDENT IN AN ORGANIZED HEALTH CARE EDUCATION/TRAINING PROGRAM

## 2023-05-08 RX ORDER — LIOTHYRONINE SODIUM 5 UG/1
TABLET ORAL
Qty: 90 TABLET | Refills: 3 | Status: SHIPPED | OUTPATIENT
Start: 2023-05-08

## 2023-05-08 ASSESSMENT — ROUTINE ASSESSMENT OF PATIENT INDEX DATA (RAPID3)
PAIN SCORE: 5
TOTAL RAPID3 SCORE: 4.22
PATIENT GLOBAL ASSESSMENT SCORE: 5
FATIGUE SCORE: 1.1
MDHAQ FUNCTION SCORE: 0.8
PSYCHOLOGICAL DISTRESS SCORE: 4.4

## 2023-05-08 NOTE — PATIENT INSTRUCTIONS
1.) Try paraffin wax baths  2.) Try turmeric 1000mg daily  3.) Try voltaren gel on affected areas 3-4 times daily as needed

## 2023-05-08 NOTE — TELEPHONE ENCOUNTER
No care due was identified.  [facility] Embedded Care Due Messages. Reference number: [id number].   5/08/2023 9:54:53 AM CDT

## 2023-05-09 NOTE — TELEPHONE ENCOUNTER
Refill Decision Note   Carina Elise  is requesting a refill authorization.  Brief Assessment and Rationale for Refill:  Approve     Medication Therapy Plan:         Comments:     Note composed:8:04 PM 05/08/2023

## 2023-05-23 ENCOUNTER — OFFICE VISIT (OUTPATIENT)
Dept: OPTOMETRY | Facility: CLINIC | Age: 83
End: 2023-05-23
Payer: MEDICARE

## 2023-05-23 ENCOUNTER — LAB VISIT (OUTPATIENT)
Dept: LAB | Facility: HOSPITAL | Age: 83
End: 2023-05-23
Attending: INTERNAL MEDICINE
Payer: MEDICARE

## 2023-05-23 DIAGNOSIS — H04.123 DRY EYE SYNDROME OF BILATERAL LACRIMAL GLANDS: Primary | ICD-10-CM

## 2023-05-23 DIAGNOSIS — H52.13 MYOPIA WITH ASTIGMATISM AND PRESBYOPIA, BILATERAL: ICD-10-CM

## 2023-05-23 DIAGNOSIS — H52.4 MYOPIA WITH ASTIGMATISM AND PRESBYOPIA, BILATERAL: ICD-10-CM

## 2023-05-23 DIAGNOSIS — Z96.1 PSEUDOPHAKIA OF BOTH EYES: ICD-10-CM

## 2023-05-23 DIAGNOSIS — H35.363 PERIPHERAL DRUSEN OF BOTH EYES: ICD-10-CM

## 2023-05-23 DIAGNOSIS — H52.203 MYOPIA WITH ASTIGMATISM AND PRESBYOPIA, BILATERAL: ICD-10-CM

## 2023-05-23 DIAGNOSIS — H26.492 LEFT POSTERIOR CAPSULAR OPACIFICATION: ICD-10-CM

## 2023-05-23 DIAGNOSIS — E03.9 HYPOTHYROIDISM, UNSPECIFIED TYPE: ICD-10-CM

## 2023-05-23 LAB — TSH SERPL DL<=0.005 MIU/L-ACNC: 0.86 UIU/ML (ref 0.4–4)

## 2023-05-23 PROCEDURE — 99999 PR PBB SHADOW E&M-EST. PATIENT-LVL III: CPT | Mod: PBBFAC,,,

## 2023-05-23 PROCEDURE — 92004 COMPRE OPH EXAM NEW PT 1/>: CPT | Mod: S$PBB,,,

## 2023-05-23 PROCEDURE — 36415 COLL VENOUS BLD VENIPUNCTURE: CPT | Mod: PO | Performed by: INTERNAL MEDICINE

## 2023-05-23 PROCEDURE — 99213 OFFICE O/P EST LOW 20 MIN: CPT | Mod: PBBFAC,PO

## 2023-05-23 PROCEDURE — 84443 ASSAY THYROID STIM HORMONE: CPT | Performed by: INTERNAL MEDICINE

## 2023-05-23 PROCEDURE — 92004 PR EYE EXAM, NEW PATIENT,COMPREHESV: ICD-10-PCS | Mod: S$PBB,,,

## 2023-05-23 PROCEDURE — 99999 PR PBB SHADOW E&M-EST. PATIENT-LVL III: ICD-10-PCS | Mod: PBBFAC,,,

## 2023-05-23 NOTE — PROGRESS NOTES
"HPI    Dle- 3 yrs- Outside Provider    Pt here for eye exam. Pt sts changes to distance va OS>OD. Floaters,   occasional, nothing new, no increase. Denies flashes/eye pain. Occasional   soreness. Gtts: otc refresh BID OU. Excess tearing.  Last edited by Maritza Mo MA on 5/23/2023  9:01 AM.            Assessment /Plan     For exam results, see Encounter Report.    Dry eye syndrome of bilateral lacrimal glands    Peripheral drusen of both eyes    Pseudophakia of both eyes    Left posterior capsular opacification  -     Ambulatory referral/consult to Ophthalmology; Future; Expected date: 05/30/2023    Myopia with astigmatism and presbyopia, bilateral      Longstanding dry eye. SPK and copious debris in TF OS>OD. Recommended pt increase use of Refresh to QID and begin incorporating gel drop at bedtime. Cautioned on ceiling vans and direct vent exposure. Ed pt that if using Refresh more than QID, switch to PF version. Discussed chronicity of dry eye and potential need for treatment with Restasis/Xiidra long term. Continue to monitor for now, sooner if symptoms worsen.  Drusen along the arcades OU. No drusen directly in the macula/fovea. BCVA 20/20- OD, OS. Monitor yearly for changes.  Stable, BCVA 20/20- OD, OS. S/p YAG OD. Monitor.   Pt feels OS has been blurry and "having a haze in front of it," for a while following cataract surgery - pt is also very sensitive to glare. Mild PCO upon examination today, specifically superior nasal. Ed pt on findings, on nature of PCO, and made referral for YAG OS due to pt's symptoms.  Doing well with current specs. No new refraction today, will re-check following eval of PCO OS/YAG.    RTC: to ophthalmology for PCO eval/possible YAG, optom for yearly                  "

## 2023-05-24 ENCOUNTER — TELEPHONE (OUTPATIENT)
Dept: FAMILY MEDICINE | Facility: CLINIC | Age: 83
End: 2023-05-24
Payer: MEDICARE

## 2023-05-24 NOTE — TELEPHONE ENCOUNTER
Please let her know that her repeat TSH is normal.  It is on the faster side of normal but still in range.    Thanks

## 2023-06-13 ENCOUNTER — OFFICE VISIT (OUTPATIENT)
Dept: OPHTHALMOLOGY | Facility: CLINIC | Age: 83
End: 2023-06-13
Payer: MEDICARE

## 2023-06-13 DIAGNOSIS — H26.492 POSTERIOR CAPSULAR OPACIFICATION VISUALLY SIGNIFICANT, LEFT EYE: Primary | ICD-10-CM

## 2023-06-13 PROCEDURE — 99205 OFFICE O/P NEW HI 60 MIN: CPT | Mod: 57,S$PBB,, | Performed by: OPHTHALMOLOGY

## 2023-06-13 PROCEDURE — 99205 PR OFFICE/OUTPT VISIT, NEW, LEVL V, 60-74 MIN: ICD-10-PCS | Mod: 57,S$PBB,, | Performed by: OPHTHALMOLOGY

## 2023-06-13 PROCEDURE — 66821 YAG CAPSULOTOMY - OS - LEFT EYE: ICD-10-PCS | Mod: S$PBB,LT,, | Performed by: OPHTHALMOLOGY

## 2023-06-13 PROCEDURE — 99212 OFFICE O/P EST SF 10 MIN: CPT | Mod: PBBFAC,PO,25 | Performed by: OPHTHALMOLOGY

## 2023-06-13 PROCEDURE — 99999 PR PBB SHADOW E&M-EST. PATIENT-LVL II: CPT | Mod: PBBFAC,,, | Performed by: OPHTHALMOLOGY

## 2023-06-13 PROCEDURE — 99999 PR PBB SHADOW E&M-EST. PATIENT-LVL II: ICD-10-PCS | Mod: PBBFAC,,, | Performed by: OPHTHALMOLOGY

## 2023-06-13 PROCEDURE — 66821 AFTER CATARACT LASER SURGERY: CPT | Mod: PBBFAC,PO,LT | Performed by: OPHTHALMOLOGY

## 2023-06-13 NOTE — PROGRESS NOTES
HPI    Ref by:      EYE MEDS:  Refresh PRN OU  Gel QPM OU       Pt has complaint of blurry vision. OS  Last edited by Darlene Marquez MD on 6/13/2023  9:45 AM.            Assessment /Plan     For exam results, see Encounter Report.    Posterior capsular opacification visually significant, left eye  -     Yag Capsulotomy - OS - Left Eye      Visually significant posterior capsular opacity present.  os  - discussed risks, benefits, and alternatives to laser surgery - pt wishes to proceed with yag laser  - Informed consent obtained and correct eye(s) verified with patient.  - Intraocular Pressure to be taken 10- 30 minutes post procedure.   - PF QID x 4 days then d/c  - f/up as scheduled    DIAGNOSIS: Visually significant posterior capsular opacity    PROCEDURE: YAG Laser Capsulotomy os    COMPLICATIONS: none     DESCRIPTION OF PROCEDURE IN DETAIL:  1 drop of topical Proparacaine and Iopidine instilled, and eye(s) dilated with 1% Tropicamide 2.5% Phenylephrine. YAG laser applied to posterior capsule in cruciate pattern.      DISPOSITION:  Patient tolerated procedure well.      Will call pt next wk to ensure doing well s/p yag cap    F/up optom REE

## 2023-06-14 ENCOUNTER — OFFICE VISIT (OUTPATIENT)
Dept: FAMILY MEDICINE | Facility: CLINIC | Age: 83
End: 2023-06-14
Payer: MEDICARE

## 2023-06-14 VITALS
HEART RATE: 65 BPM | HEIGHT: 60 IN | DIASTOLIC BLOOD PRESSURE: 86 MMHG | WEIGHT: 98.31 LBS | SYSTOLIC BLOOD PRESSURE: 136 MMHG | RESPIRATION RATE: 20 BRPM | TEMPERATURE: 98 F | BODY MASS INDEX: 19.3 KG/M2 | OXYGEN SATURATION: 95 %

## 2023-06-14 DIAGNOSIS — K21.9 GASTROESOPHAGEAL REFLUX DISEASE WITHOUT ESOPHAGITIS: ICD-10-CM

## 2023-06-14 DIAGNOSIS — M79.7 FIBROMYALGIA: ICD-10-CM

## 2023-06-14 DIAGNOSIS — Z95.0 PACEMAKER: ICD-10-CM

## 2023-06-14 DIAGNOSIS — Z78.0 ASYMPTOMATIC MENOPAUSAL STATE: ICD-10-CM

## 2023-06-14 DIAGNOSIS — I48.0 PAROXYSMAL ATRIAL FIBRILLATION: Primary | ICD-10-CM

## 2023-06-14 DIAGNOSIS — I87.2 VENOUS INSUFFICIENCY: ICD-10-CM

## 2023-06-14 DIAGNOSIS — F41.9 ANXIETY: ICD-10-CM

## 2023-06-14 DIAGNOSIS — N39.3 STRESS INCONTINENCE: ICD-10-CM

## 2023-06-14 DIAGNOSIS — M25.50 POLYARTHRALGIA: ICD-10-CM

## 2023-06-14 DIAGNOSIS — J43.9 PULMONARY EMPHYSEMA, UNSPECIFIED EMPHYSEMA TYPE: ICD-10-CM

## 2023-06-14 DIAGNOSIS — M81.0 AGE-RELATED OSTEOPOROSIS WITHOUT CURRENT PATHOLOGICAL FRACTURE: ICD-10-CM

## 2023-06-14 DIAGNOSIS — I49.5 SSS (SICK SINUS SYNDROME): ICD-10-CM

## 2023-06-14 DIAGNOSIS — E03.9 HYPOTHYROIDISM, UNSPECIFIED TYPE: ICD-10-CM

## 2023-06-14 PROCEDURE — 99214 PR OFFICE/OUTPT VISIT, EST, LEVL IV, 30-39 MIN: ICD-10-PCS | Mod: S$GLB,,, | Performed by: INTERNAL MEDICINE

## 2023-06-14 PROCEDURE — 99214 OFFICE O/P EST MOD 30 MIN: CPT | Mod: S$GLB,,, | Performed by: INTERNAL MEDICINE

## 2023-06-14 RX ORDER — LEVOTHYROXINE SODIUM 112 UG/1
112 TABLET ORAL DAILY
Qty: 90 TABLET | Refills: 3 | Status: SHIPPED | OUTPATIENT
Start: 2023-06-14

## 2023-06-14 NOTE — PROGRESS NOTES
Subjective:       Patient ID: Carina Elise is a 82 y.o. female.    Medication List with Changes/Refills   Current Medications    ALUMINUM-MAGNESIUM HYDROXIDE-SIMETHICONE (MAALOX) 200-200-20 MG/5 ML SUSP    Take 30 mLs by mouth 4 (four) times daily before meals and nightly.    ASPIRIN (ECOTRIN) 81 MG EC TABLET    Take 81 mg by mouth once daily.    CALCIUM CARBONATE (OS-TYRELL) 500 MG CALCIUM (1,250 MG) CHEWABLE TABLET    Take 1 tablet by mouth once daily.    CALCIUM CARBONATE (TUMS ORAL)    Tums   PRN    FAMOTIDINE (PEPCID) 40 MG TABLET    Take 1 tablet (40 mg total) by mouth once daily.    KETOCONAZOLE (NIZORAL) 2 % SHAMPOO    Apply topically twice a week.    LEVALBUTEROL (XOPENEX) 1.25 MG/3 ML NEBULIZER SOLUTION    Take 3 mLs (1.25 mg total) by nebulization every 4 (four) hours as needed for Wheezing. Rescue    LIOTHYRONINE (CYTOMEL) 5 MCG TAB    TAKE 1 TABLET DAILY    LYSINE ORAL    Take 500 mg by mouth once daily.    METOPROLOL SUCCINATE (TOPROL-XL) 25 MG 24 HR TABLET    Take 12.5 mg by mouth once daily. Take half tab daily    PROPAFENONE (RHTHYMOL) 150 MG TAB    Take 150 mg by mouth 2 (two) times daily.    SIMETHICONE (MYLICON) 40 MG/0.6 ML DROPS    Take 40 mg by mouth 4 (four) times daily as needed.    VITAMIN D (VITAMIN D3) 1000 UNITS TAB    Take 1,000 Units by mouth.   Changed and/or Refilled Medications    Modified Medication Previous Medication    LEVOTHYROXINE (SYNTHROID) 112 MCG TABLET levothyroxine (SYNTHROID) 112 MCG tablet       Take 1 tablet (112 mcg total) by mouth once daily.    Take 1 tablet (112 mcg total) by mouth once daily.       Chief Complaint: Follow-up  She is here today to f/u on chronic medical issues.      She has emphysema and is followed by pulmonary. She was last seen on 3/2023.  She has chronic shortness of breath but denies wheezing or coughing. She has xopenex but had not needed to use. CXR on 1/2023 was clear with vascular calcifications. Today she feels she is doing well and her  shortness of breath has improved.      She as paroxysmal Afib s/p RFA in 2011.  She has a pacemaker due SSS and denies any recurrent Afib since ablation. No known CAD or hypertension. She is taking rhthymol 150 mg bid  and metoprolol 12.5 mg daily. She is anticoagulated with aspirin daily. She is managed by Dr. Lao in cardiology.  She had a stress echo per patient that was reassuring. She continues to have intermittent chest pressure that is associated with indigestion.  Lipids on 2/2022 were 168/74/69/84.      She has venous insufficiency with intermittent lower leg swelling. She had venous ablation surgery in the past.      She has hypothyroid and is taking levothyroxine 112 mcg daily and cytomel 5 mcg daily.  She feels this is well controlled and TSH on 5/2023 was normal (this was a recheck on generic levothyroxine).       She has acid reflux with dyspepsia and unable to take PPI due to intolerance in the past. EGD on 2/2023 was normal with biopsy showing chronic gastritis with GERD.  Esophogram on 12/2022 showed esophageal dysmotility.  She was seen by GI on 1/2023 and started on famotidine 40 mg qhs. She does feel her symptoms have improved after her EGD and only uses famotidine PRN.      She does complain of mild stress incontinence with laughing, sneezing or bending forward.  She feels this is stable.      Recall there was concern for the diagnosis of temporal arteritis due to elevated inflammatory markers, headaches and weakness.  She was noted to have jaw pain due to chewing fatigue but no claudication.  She had refused the TA biopsy in the past.  Labs showed ESR of 18 with CRP of 51.  Normal u/s of the temporal arteries.  Normal CBC, CMP and SPEP. Her headaches have resolved and she denies any jaw pain. She continues to have diffuse muscle pain but feels it is improved with starting PT.  Her last inflammatory markers on 5/2023 were normal with ESR 15 and CRP 7.8. 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  She was given reassurance and advised to f/u in 6 months.      She has fibromyalgia for many years that will flare with muscle aches and weakness. She is treated with regular exercise through physical therapy. She feels this has improved and she is back to baseline.      She has intermittent anxiety but denies depression. She has had for many years and has only tried xanax which she did not like. She is sleeping well.  She feels this is improved and denies any active symptoms.      She has osteopenia with a DEXA on 12/2021 but does not have the results today. She is not on treatment.      She lives alone and her daughter is involved with her care. She exercises regularly with PT. She eats healthy. Her weight has been stable since her initial weight loss last year. She is eating 3 meals a day with snacks and boost.  She is improved with her appetite and swallowing after her EGD.      Colonoscopy---refused   Mammogram---3/2023 neg   DEXA-----12/2021 (don't have the results)  Tdap---unknown  Influenza vaccine---refused   Pneumovax 23----refused  Prevnar 13----10/2011  Shingrex vaccine-----refused   Covid vaccine---refused     Review of Systems   Constitutional:  Negative for appetite change, fatigue, fever and unexpected weight change.   HENT:  Negative for congestion, ear pain, hearing loss, sore throat and trouble swallowing.    Eyes:  Negative for pain and visual disturbance.   Respiratory:  Negative for cough, chest tightness, shortness of breath and wheezing.    Cardiovascular:  Positive for leg swelling. Negative for chest pain and palpitations.   Gastrointestinal:  Negative for abdominal pain, blood in stool, constipation, diarrhea, nausea and vomiting.   Endocrine: Negative for polyuria.   Genitourinary:  Negative for dysuria and hematuria.   Musculoskeletal:  Positive for myalgias. Negative for arthralgias and back pain.   Skin:  Negative for rash.    Neurological:  Positive for light-headedness. Negative for dizziness, weakness, numbness and headaches.   Hematological:  Does not bruise/bleed easily.   Psychiatric/Behavioral:  Negative for dysphoric mood, sleep disturbance and suicidal ideas. The patient is not nervous/anxious.      Objective:      Vitals:    06/14/23 1124   BP: 136/86   Pulse: 65   Resp: 20   Temp: 98.4 °F (36.9 °C)   SpO2: 95%   Weight: 44.6 kg (98 lb 5.2 oz)   Height: 5' (1.524 m)     Body mass index is 19.2 kg/m².  Physical Exam    General appearance: No acute distress, cooperative, kyphotic curve  Eyes: PERRL, EOMI, conjunctiva clear  Nose: Normal mucosa without drainage  Throat: no exudates or erythema, tonsils not enlarged  Mouth: no sores or lesions, moist mucous membranes  Neck: FROM, soft, supple, no thyromegaly, no bruits  Lymph: no anterior or posterior cervical adenopathy  Heart::  Regular rate and rhythm, no murmur  Lung: Clear to ascultation bilaterally, no wheezing, no rales, no rhonchi, no distress  Abdomen: Soft, nontender, no distention, no hepatosplenomegaly, bowel sounds normal, no guarding, no rebound, no peritoneal signs  Skin: no rashes, no lesions  Extremities: no edema, no cyanosis  Neuro: CN 2-12 intact, 5/5 muscle strength upper and lower extremity bilaterally, 2+ DTRs UE and LE bilaterally, normal gait  Peripheral pulses: 2+ pedal pulses bilaterally, varicose veins   Musculoskeletal: FROM, good strenth, no tenderness  Joint: normal appearance, no swelling, no warmth, no deformity in all joints    Assessment:       1. Paroxysmal atrial fibrillation    2. SSS (sick sinus syndrome)    3. Pacemaker    4. Venous insufficiency    5. Pulmonary emphysema, unspecified emphysema type    6. Hypothyroidism, unspecified type    7. Gastroesophageal reflux disease without esophagitis    8. Stress incontinence    9. Fibromyalgia    10. Polyarthralgia    11. Anxiety    12. Age-related osteoporosis without current pathological  fracture    13. Asymptomatic menopausal state        Plan:       Paroxysmal atrial fibrillation  NSR today and continue on rhythmol, aspirin and metoprolol.    -     CBC Auto Differential; Future; Expected date: 06/14/2023  -     Comprehensive Metabolic Panel; Future; Expected date: 06/14/2023    SSS (sick sinus syndrome) with Pacemaker  Stable and she is doing well    Venous insufficiency  Stable and no swelling seen today    Pulmonary emphysema, unspecified emphysema type  Stable and no active symptoms. She has not been needing xopenex    Hypothyroidism, unspecified type  Good control on this dose of levothyroxine and cytomel.   -     levothyroxine (SYNTHROID) 112 MCG tablet; Take 1 tablet (112 mcg total) by mouth once daily.  Dispense: 90 tablet; Refill: 3  -     TSH; Future; Expected date: 06/14/2023    Gastroesophageal reflux disease without esophagitis  Good control and no needed famotidine    Stress incontinence  Stable and she does not want treatment.     Fibromyalgia with Polyarthralgia  Improved and back to baseline. She is doing well with PT. She continues to follow with rheumatology and her inflammatory markers are back to normal    Anxiety  Improved and she is doing better. Continue to monitor    Age-related osteoporosis without current pathological fracture  Uncontrolled and she has not wanted treatment. Due to recheck DEXA in December.    Asymptomatic menopausal state  -     DXA Bone Density Axial Skeleton 1 or more sites; Future; Expected date: 06/14/2023    Follow up in about 6 months (around 12/14/2023) for chronic medical issues.

## 2023-06-19 ENCOUNTER — TELEPHONE (OUTPATIENT)
Dept: OPHTHALMOLOGY | Facility: CLINIC | Age: 83
End: 2023-06-19
Payer: MEDICARE

## 2023-10-17 ENCOUNTER — TELEPHONE (OUTPATIENT)
Dept: FAMILY MEDICINE | Facility: CLINIC | Age: 83
End: 2023-10-17
Payer: MEDICARE

## 2023-10-17 NOTE — TELEPHONE ENCOUNTER
Spoke with Dr. Diaz - pt should be scheduled for evaluation with her. Appt for 10/18 2 10 am scheduled

## 2023-10-17 NOTE — TELEPHONE ENCOUNTER
Pt has red and painful area on rt ankle - worsening over the past month. Has history of venous insuffiencey. Wants to know if she should see you or get a referral to Vascular. Please advise.

## 2023-10-18 ENCOUNTER — OFFICE VISIT (OUTPATIENT)
Dept: FAMILY MEDICINE | Facility: CLINIC | Age: 83
End: 2023-10-18
Payer: MEDICARE

## 2023-10-18 VITALS
SYSTOLIC BLOOD PRESSURE: 122 MMHG | HEIGHT: 60 IN | TEMPERATURE: 98 F | HEART RATE: 77 BPM | OXYGEN SATURATION: 98 % | BODY MASS INDEX: 19.44 KG/M2 | DIASTOLIC BLOOD PRESSURE: 80 MMHG | RESPIRATION RATE: 16 BRPM | WEIGHT: 99 LBS

## 2023-10-18 DIAGNOSIS — I83.008 VENOUS STASIS ULCER OF OTHER PART OF LOWER LEG LIMITED TO BREAKDOWN OF SKIN WITH VARICOSE VEINS, UNSPECIFIED LATERALITY: Primary | ICD-10-CM

## 2023-10-18 DIAGNOSIS — L97.801 VENOUS STASIS ULCER OF OTHER PART OF LOWER LEG LIMITED TO BREAKDOWN OF SKIN WITH VARICOSE VEINS, UNSPECIFIED LATERALITY: Primary | ICD-10-CM

## 2023-10-18 DIAGNOSIS — R60.0 EDEMA OF BOTH LOWER LEGS: ICD-10-CM

## 2023-10-18 PROCEDURE — 99213 PR OFFICE/OUTPT VISIT, EST, LEVL III, 20-29 MIN: ICD-10-PCS | Mod: S$GLB,,, | Performed by: INTERNAL MEDICINE

## 2023-10-18 PROCEDURE — 99213 OFFICE O/P EST LOW 20 MIN: CPT | Mod: S$GLB,,, | Performed by: INTERNAL MEDICINE

## 2023-10-18 RX ORDER — CARBOXYMETHYLCELLULOSE SODIUM 10 MG/ML
SOLUTION/ DROPS OPHTHALMIC
COMMUNITY

## 2023-10-18 NOTE — PROGRESS NOTES
Subjective:       Patient ID: Carina Elise is a 83 y.o. female.    Medication List with Changes/Refills   Current Medications    ALUMINUM-MAGNESIUM HYDROXIDE-SIMETHICONE (MAALOX) 200-200-20 MG/5 ML SUSP    Take 30 mLs by mouth 4 (four) times daily before meals and nightly.    ASPIRIN (ECOTRIN) 81 MG EC TABLET    Take 81 mg by mouth once daily.    CALCIUM CARBONATE (OS-TYRELL) 500 MG CALCIUM (1,250 MG) CHEWABLE TABLET    Take 1 tablet by mouth once daily.    CALCIUM CARBONATE (TUMS ORAL)    Tums   PRN    CARBOXYMETHYLCELLULOSE SODIUM (ARTIFICIAL TEARS, [facility],) 1 % DROP    Apply to eye.    FAMOTIDINE (PEPCID) 40 MG TABLET    Take 1 tablet (40 mg total) by mouth once daily.    KETOCONAZOLE (NIZORAL) 2 % SHAMPOO    Apply topically twice a week.    LACTOBACILLUS RHAMNOSUS GG (CULTURELLE) 10 BILLION CELL CAPSULE    Take 1 capsule by mouth once daily.    LEVALBUTEROL (XOPENEX) 1.25 MG/3 ML NEBULIZER SOLUTION    Take 3 mLs (1.25 mg total) by nebulization every 4 (four) hours as needed for Wheezing. Rescue    LEVOTHYROXINE (SYNTHROID) 112 MCG TABLET    Take 1 tablet (112 mcg total) by mouth once daily.    LIOTHYRONINE (CYTOMEL) 5 MCG TAB    TAKE 1 TABLET DAILY    LYSINE ORAL    Take 500 mg by mouth once daily.    METOPROLOL SUCCINATE (TOPROL-XL) 25 MG 24 HR TABLET    Take 12.5 mg by mouth once daily. Take half tab daily    PROPAFENONE (RHTHYMOL) 150 MG TAB    Take 150 mg by mouth 2 (two) times daily.    SIMETHICONE (MYLICON) 40 MG/0.6 ML DROPS    Take 40 mg by mouth 4 (four) times daily as needed.    VITAMIN D (VITAMIN D3) 1000 UNITS TAB    Take 1,000 Units by mouth.       Chief Complaint: Ankle Pain  She presents today with sores on lower legs and swelling.     She has paroxysmal Afib, SSS s/p pacer, venous insufficiency, hypothyroid and fibromyalgia.      She presents with 2 sores on her lower legs that started 6 months ago and are not healing. They are getting bigger and the sore on the right is painful. It is red and  "sensitive to touch.  She has known varicose veins and has chronic lower leg swelling that is relieved with elevating her feet.  This is unchanged.  The sores are not draining. No warmth. She does have lower leg pain with walking but more of a weakness/heaviness. She was seen by cardiology on 8/2023 and forgot to mention her feet. She had an echo at that appt that per patient showed "everything looked okay".  She has not had any recent imaging of her lower legs.     Review of Systems   Constitutional:  Negative for activity change, appetite change, chills, fatigue and fever.   HENT:  Negative for congestion, ear discharge, ear pain, mouth sores, postnasal drip, rhinorrhea, sinus pressure and sore throat.    Eyes:  Negative for pain, discharge and redness.   Respiratory:  Negative for cough, chest tightness, shortness of breath and wheezing.    Cardiovascular:  Positive for leg swelling.   Gastrointestinal:  Negative for abdominal pain, constipation, diarrhea, nausea and vomiting.   Genitourinary:  Negative for dysuria.   Musculoskeletal:  Positive for arthralgias. Negative for neck stiffness.   Skin:  Positive for color change and wound. Negative for rash.   Neurological:  Negative for headaches.   Hematological:  Negative for adenopathy.       Objective:      Vitals:    10/18/23 1001   BP: 122/80   Pulse: 77   Resp: 16   Temp: 97.9 °F (36.6 °C)   SpO2: 98%   Weight: 44.9 kg (98 lb 15.8 oz)   Height: 5' (1.524 m)     Body mass index is 19.33 kg/m².  Physical Exam    General appearance: No acute distress, cooperative  Neck: FROM, soft, supple, no thyromegaly, no bruits  Lymph: no anterior or posterior cervical adenopathy  Heart::  Regular rate and rhythm, no murmur  Lung: Clear to ascultation bilaterally, no wheezing, no rales, no rhonchi, no distress  Abdomen: Soft, nontender, no distention, no hepatosplenomegaly, bowel sounds normal, no guarding, no rebound, no peritoneal signs  Skin: medial aspect of bilateral " lower legs with 2 cm x 2 cm lesion that is tender to touch with erythema, left side with fluctuance   Extremities: trace edema pitting bilateral, very prominent varicose veins  Neuro: no focal abnormalities, strength 5/5 b/l UE and LE, 2+ DTRs b/l UE and LE, normal gait  Peripheral pulses: 2+ pedal pulses bilaterally,  Musculoskeletal: FROM, good strenth, diffuse tenderness with light touch   Joint: normal appearance, no swelling, no warmth, no deformity in all joints       Left medial 2 cm x 2 cm      Right medial 2 cm x 2 cm     Assessment:       1. Venous stasis ulcer of other part of lower leg limited to breakdown of skin with varicose veins, unspecified laterality    2. Edema of both lower legs        Plan:       Venous stasis ulcer of other part of lower leg limited to breakdown of skin with varicose veins, unspecified laterality  I suspect venous ulcer and will get u/s. Referral to wound clinic to help with healing.   -     US Lower Extremity Veins Bilateral; Future; Expected date: 10/18/2023  -     Ambulatory referral/consult to Podiatry; Future; Expected date: 10/25/2023    Follow up for already scheduled.

## 2023-11-07 NOTE — PROGRESS NOTES
Subjective:      Patient ID: Carina Elise is a 83 y.o. female.    Chief Complaint: Disease Management    HPI    Rheumatology History:      - Diagnosis/es:              - Psoriasis diagnosed 2022              - Fibromyalgia              - Osteopenia off medication  - Positive serologies: N/A  - Negative serologies: ALONSO  - Imaging:    - Xray shoulders (5/2023) The bones are osteopenic.  There is right infraspinatus calcific tendinitis.  No acute fracture, dislocation, or osseous destructive process appreciated radiographically.  There is minor degenerative change of the left acromioclavicular joint.  There are cardiac pacemaker wires present in the heart.  The left and right chest show no acute abnormality.   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  - Previous Treatments: N/A  - Current Treatments:               - Tylenol   - Turmeric              - PT              - Dry needling              - Massage  Interval History:   She is doing well with conservative interventions.     Objective:   /73   Pulse 67   Ht 5' (1.524 m)   Wt 45 kg (99 lb 3.3 oz)   BMI 19.38 kg/m²   Physical Exam   Constitutional: normal appearance.   HENT:   Head: Normocephalic and atraumatic.   Cardiovascular: Normal rate, regular rhythm and normal heart sounds.   Pulmonary/Chest: Effort normal and breath sounds normal.   Musculoskeletal:      Comments: + Heberden's and Kailey's nodes  No synovitis, dactylitis, enthesitis, effusions     Neurological: She is alert.   Skin: Skin is warm and dry. No rash noted.   No skin thickening, telangiectasias, calcinosis, psoriasiform lesions, lupoid lesions         No data to display     Assessment:     1. Fibromyalgia    2. Osteoarthritis, unspecified osteoarthritis type, unspecified site    3. Osteopenia after menopause      This is an 83 year old woman with history of psoriasis, COPD, paroxysmal atrial fibrillation s/p RFA (2011),  hypothyroidism, GERD, esophageal dysmotility, fibromyalgia, osteopenia, anxiety, osteoarthritis, and left shoulder tendinitis. At last visit, I offered IA steroid injection but she states she is not able to take steroids. Continue conservative measures.     Plan:     Problem List Items Addressed This Visit    None  Visit Diagnoses       Fibromyalgia    -  Primary    Osteoarthritis, unspecified osteoarthritis type, unspecified site        Osteopenia after menopause              - Voltaren gel PRN  - paraffin wax baths   - turmeric 1000 mg daily  - continue physical therapy  - DEXA due 12/2023, per patient monitored by PCP    Follow up in 6 months    30 minutes of total time spent on the encounter, which includes face to face time and non-face to face time preparing to see the patient (eg, review of tests), Obtaining and/or reviewing separately obtained history, Documenting clinical information in the electronic or other health record, Independently interpreting results (not separately reported) and communicating results to the patient/family/caregiver, or Care coordination (not separately reported).     This note was prepared with Normal Direct voice recognition transcription software. Garbled syntax, mangled pronouns, and other bizarre constructions may be attributed to that software system       Paulette Ross M.D.  Rheumatology Dept  [place], [place]

## 2023-11-08 ENCOUNTER — OFFICE VISIT (OUTPATIENT)
Dept: RHEUMATOLOGY | Facility: CLINIC | Age: 83
End: 2023-11-08
Payer: MEDICARE

## 2023-11-08 VITALS
BODY MASS INDEX: 19.47 KG/M2 | SYSTOLIC BLOOD PRESSURE: 132 MMHG | HEART RATE: 67 BPM | DIASTOLIC BLOOD PRESSURE: 73 MMHG | HEIGHT: 60 IN | WEIGHT: 99.19 LBS

## 2023-11-08 DIAGNOSIS — M79.7 FIBROMYALGIA: Primary | ICD-10-CM

## 2023-11-08 DIAGNOSIS — Z78.0 OSTEOPENIA AFTER MENOPAUSE: ICD-10-CM

## 2023-11-08 DIAGNOSIS — M85.80 OSTEOPENIA AFTER MENOPAUSE: ICD-10-CM

## 2023-11-08 DIAGNOSIS — M19.90 OSTEOARTHRITIS, UNSPECIFIED OSTEOARTHRITIS TYPE, UNSPECIFIED SITE: ICD-10-CM

## 2023-11-08 PROCEDURE — 99999 PR PBB SHADOW E&M-EST. PATIENT-LVL III: CPT | Mod: PBBFAC,,, | Performed by: STUDENT IN AN ORGANIZED HEALTH CARE EDUCATION/TRAINING PROGRAM

## 2023-11-08 PROCEDURE — 99214 PR OFFICE/OUTPT VISIT, EST, LEVL IV, 30-39 MIN: ICD-10-PCS | Mod: S$PBB,,, | Performed by: STUDENT IN AN ORGANIZED HEALTH CARE EDUCATION/TRAINING PROGRAM

## 2023-11-08 PROCEDURE — 99214 OFFICE O/P EST MOD 30 MIN: CPT | Mod: S$PBB,,, | Performed by: STUDENT IN AN ORGANIZED HEALTH CARE EDUCATION/TRAINING PROGRAM

## 2023-11-08 PROCEDURE — 99999 PR PBB SHADOW E&M-EST. PATIENT-LVL III: ICD-10-PCS | Mod: PBBFAC,,, | Performed by: STUDENT IN AN ORGANIZED HEALTH CARE EDUCATION/TRAINING PROGRAM

## 2023-11-08 PROCEDURE — 99213 OFFICE O/P EST LOW 20 MIN: CPT | Mod: PBBFAC,PN | Performed by: STUDENT IN AN ORGANIZED HEALTH CARE EDUCATION/TRAINING PROGRAM

## 2023-11-08 ASSESSMENT — ROUTINE ASSESSMENT OF PATIENT INDEX DATA (RAPID3)
PAIN SCORE: 4
MDHAQ FUNCTION SCORE: 0.9
PATIENT GLOBAL ASSESSMENT SCORE: 1.5
FATIGUE SCORE: 4
PSYCHOLOGICAL DISTRESS SCORE: 0
TOTAL RAPID3 SCORE: 2.83

## 2023-12-12 ENCOUNTER — OFFICE VISIT (OUTPATIENT)
Dept: PODIATRY | Facility: CLINIC | Age: 83
End: 2023-12-12
Payer: MEDICARE

## 2023-12-12 ENCOUNTER — HOSPITAL ENCOUNTER (OUTPATIENT)
Dept: RADIOLOGY | Facility: HOSPITAL | Age: 83
Discharge: HOME | End: 2023-12-12
Attending: INTERNAL MEDICINE
Payer: MEDICARE

## 2023-12-12 VITALS — HEIGHT: 60 IN | WEIGHT: 99.19 LBS | BODY MASS INDEX: 19.47 KG/M2

## 2023-12-12 DIAGNOSIS — R60.0 PERIPHERAL EDEMA: ICD-10-CM

## 2023-12-12 DIAGNOSIS — I80.02 THROMBOPHLEBITIS OF SUPERFICIAL VEINS OF LEFT LOWER EXTREMITY: ICD-10-CM

## 2023-12-12 DIAGNOSIS — I83.008 VENOUS STASIS ULCER OF OTHER PART OF LOWER LEG LIMITED TO BREAKDOWN OF SKIN WITH VARICOSE VEINS, UNSPECIFIED LATERALITY: ICD-10-CM

## 2023-12-12 DIAGNOSIS — L97.801 VENOUS STASIS ULCER OF OTHER PART OF LOWER LEG LIMITED TO BREAKDOWN OF SKIN WITH VARICOSE VEINS, UNSPECIFIED LATERALITY: ICD-10-CM

## 2023-12-12 DIAGNOSIS — R60.0 EDEMA OF BOTH LOWER LEGS: ICD-10-CM

## 2023-12-12 DIAGNOSIS — I87.2 VENOUS INSUFFICIENCY: Primary | ICD-10-CM

## 2023-12-12 PROCEDURE — 99203 OFFICE O/P NEW LOW 30 MIN: CPT | Mod: S$PBB,,, | Performed by: PODIATRIST

## 2023-12-12 PROCEDURE — 93970 EXTREMITY STUDY: CPT | Mod: TC,PO

## 2023-12-12 PROCEDURE — 93970 EXTREMITY STUDY: CPT | Mod: 26,,, | Performed by: RADIOLOGY

## 2023-12-12 PROCEDURE — 99213 OFFICE O/P EST LOW 20 MIN: CPT | Mod: PBBFAC,25,PO | Performed by: PODIATRIST

## 2023-12-12 PROCEDURE — 99999 PR PBB SHADOW E&M-EST. PATIENT-LVL III: CPT | Mod: PBBFAC,,, | Performed by: PODIATRIST

## 2023-12-12 PROCEDURE — 99999 PR PBB SHADOW E&M-EST. PATIENT-LVL III: ICD-10-PCS | Mod: PBBFAC,,, | Performed by: PODIATRIST

## 2023-12-12 PROCEDURE — 93970 US LOWER EXTREMITY VEINS BILATERAL: ICD-10-PCS | Mod: 26,,, | Performed by: RADIOLOGY

## 2023-12-12 PROCEDURE — 99203 PR OFFICE/OUTPT VISIT, NEW, LEVL III, 30-44 MIN: ICD-10-PCS | Mod: S$PBB,,, | Performed by: PODIATRIST

## 2023-12-13 NOTE — PROGRESS NOTES
Subjective:     Patient ID: Carina Elise is a 83 y.o. female.    Chief Complaint: Ankle Pain (Red area on the inside of both ankles )    Carina is a 83 y.o. female with a past medical history of A-fib, Bronchitis, Gastritis, and Pulmonary embolism. The patient's chief complaint consists of an area of pain, swelling, and discoloration along the distal medial Lt. Lower leg.  Patient was referred by family medicine for further evaluation fearing this could possibly be the start of a venous ulceration.  Patient notes obtaining a venous ultrasound earlier this AM and inquired as to the results.  States a similar issue was present in the same area on the contralateral limb, however, this was actually an open wound.  Notes this has since healed, although there is occasional sensitivity from the area.  She has not been treating the current issue.  Rates pain as an 8/10.  Symptoms are aggravated with applied pressure to the site.  Denies use of compression stockings but attempts to be active and exercise.  Denies sustaining recent trauma to the limb.  Denies any additional pedal complaints.      Past Medical History:   Diagnosis Date    A-fib     Bronchitis     Gastritis     Pulmonary embolism        Past Surgical History:   Procedure Laterality Date    BLADDER NECK SUSPENSION      BREAST BIOPSY Right 1969    benign needle biopsy    CARDIAC ELECTROPHYSIOLOGY STUDY AND ABLATION      CATARACT EXTRACTION W/  INTRAOCULAR LENS IMPLANT Bilateral 2017    ESOPHAGEAL DILATION N/A 10/03/2018    Procedure: DILATION, ESOPHAGUS;  Surgeon: Kirill Lopez MD;  Location: [facility];  Service: Endoscopy;  Laterality: N/A;    ESOPHAGOGASTRODUODENOSCOPY N/A 10/03/2018    Procedure: EGD (ESOPHAGOGASTRODUODENOSCOPY);  Surgeon: Kirill Lopez MD;  Location: [facility];  Service: Endoscopy;  Laterality: N/A;    ESOPHAGOGASTRODUODENOSCOPY N/A 02/07/2023    Procedure: EGD (ESOPHAGOGASTRODUODENOSCOPY);  Surgeon: Irineo Man MD;   Location: [facility];  Service: Endoscopy;  Laterality: N/A;    EYE SURGERY      cataract    HYSTERECTOMY      left finger      SHOULDER ARTHROSCOPY Right     TEMPOROMANDIBULAR JOINT SURGERY Bilateral     UPPER GASTROINTESTINAL ENDOSCOPY         Family History   Problem Relation Age of Onset    Heart disease Mother     Heart disease Father     Macular degeneration Sister     Breast cancer Sister 82    Heart disease Sister     Heart disease Brother     Breast cancer Cousin     Macular degeneration Cousin     Breast cancer Cousin     Glaucoma Neg Hx        Social History     Socioeconomic History    Marital status:    Tobacco Use    Smoking status: Never    Smokeless tobacco: Never   Substance and Sexual Activity    Alcohol use: No    Drug use: No       Current Outpatient Medications   Medication Sig Dispense Refill    aluminum-magnesium hydroxide-simethicone (MAALOX) 200-200-20 mg/5 mL Susp Take 30 mLs by mouth 4 (four) times daily before meals and nightly.      aspirin (ECOTRIN) 81 MG EC tablet Take 81 mg by mouth once daily.      calcium carbonate (OS-TYRELL) 500 mg calcium (1,250 mg) chewable tablet Take 1 tablet by mouth once daily.      calcium carbonate (TUMS ORAL) Tums   PRN      carboxymethylcellulose sodium (ARTIFICIAL TEARS, CMC,) 1 % Drop Apply to eye.      famotidine (PEPCID) 40 MG tablet Take 1 tablet (40 mg total) by mouth once daily. 90 tablet 3    ketoconazole (NIZORAL) 2 % shampoo Apply topically twice a week. 120 mL 6    Lactobacillus rhamnosus GG (CULTURELLE) 10 billion cell capsule Take 1 capsule by mouth once daily.      levalbuterol (XOPENEX) 1.25 mg/3 mL nebulizer solution Take 3 mLs (1.25 mg total) by nebulization every 4 (four) hours as needed for Wheezing. Rescue 1080 mL 5    levothyroxine (SYNTHROID) 112 MCG tablet Take 1 tablet (112 mcg total) by mouth once daily. 90 tablet 3    liothyronine (CYTOMEL) 5 MCG Tab TAKE 1 TABLET DAILY 90 tablet 3    LYSINE ORAL Take 500 mg by mouth once  daily.      metoprolol succinate (TOPROL-XL) 25 MG 24 hr tablet Take 12.5 mg by mouth once daily. Take half tab daily      propafenone (RHTHYMOL) 150 MG Tab Take 150 mg by mouth 2 (two) times daily.      simethicone (MYLICON) 40 mg/0.6 mL drops Take 40 mg by mouth 4 (four) times daily as needed.      vitamin D (VITAMIN D3) 1000 units Tab Take 1,000 Units by mouth.       No current facility-administered medications for this visit.       Review of patient's allergies indicates:   Allergen Reactions    Acetazolamide Other (See Comments)    Adhesive Other (See Comments)    Albuterol     Amitriptyline Other (See Comments)    Antihistamines - alkylamine Other (See Comments)    Antivert [meclizine]     Azithromycin Other (See Comments)    Benadryl allergy decongestant     Benzodiazepines     Calcium channel blocking agent diltiazem analogues     Cardizem [diltiazem hcl] Other (See Comments)     High bp    Chlorzoxazone Other (See Comments)    Cholestyramine     Ciprofloxacin Other (See Comments)    Clindamycin Other (See Comments)     Mult others scanned in    Corticosteroids (glucocorticoids) Other (See Comments)     Burning sensation to area on application  Burning sensation to area on application      Diamox (sodium)     Diazepam Other (See Comments)    Diphenhydramine hcl Other (See Comments)    Docusate calcium Other (See Comments)    Doxycycline Other (See Comments)    Epinephrine Other (See Comments)    Erythromycin base     Estradiol Other (See Comments)    Hydralazine Other (See Comments)    Hydrochlorothiazide     Levaquin [levofloxacin] Other (See Comments)     Sees black spots, spinning    Lidocaine      Pt unable to recall reaction    Medrol [methylprednisolone]     Medroxyprogesterone     Nefazodone Other (See Comments)    Nitrofurantoin     Nitrofurantoin monohyd/m-cryst Other (See Comments)    Omeprazole     Opioids - morphine analogues     Oxycodone     Oxycodone-acetaminophen     Paroxetine     Paroxetine  hcl Other (See Comments)    Penicillins Hives and Other (See Comments)    Prednisone     Propoxyphene     Raloxifene Other (See Comments)    Sertraline Other (See Comments)    Tessalon [benzonatate]     Tetanus and diphtheria toxoids Other (See Comments)    Tetanus vaccines and toxoid Swelling    Tetracyclines     Topiramate Other (See Comments)    Tramadol Other (See Comments)    Verapamil Other (See Comments)    Zolpidem Other (See Comments)    Aspirin Palpitations and Other (See Comments)    Codeine Rash and Other (See Comments)    Darvocet a500 [propoxyphene n-acetaminophen] Rash    Iodinated contrast media Palpitations and Other (See Comments)     Racing heart, uncontrollable shakes    Meperidine Rash    Morphine Rash    Sulfa (sulfonamide antibiotics) Rash and Other (See Comments)        Review of Systems   Constitutional: Negative for chills and fever.   Cardiovascular:  Positive for leg swelling.   Skin:  Positive for color change. Negative for nail changes.   Musculoskeletal:  Positive for joint swelling and myalgias. Negative for muscle cramps and muscle weakness.   Gastrointestinal:  Negative for nausea and vomiting.   Neurological:  Negative for numbness and paresthesias.   Psychiatric/Behavioral:  Negative for altered mental status.         Objective:     Physical Exam  Constitutional:       Appearance: Normal appearance. She is not ill-appearing.   Cardiovascular:      Pulses:           Dorsalis pedis pulses are 2+ on the right side and 2+ on the left side.        Posterior tibial pulses are 2+ on the right side and 2+ on the left side.      Comments: CFT is < 3 seconds bilateral.  Pedal hair growth is present bilateral.  Varicosities noted bilateral.  Toes are warm to touch bilateral.    Musculoskeletal:         General: Tenderness present. No signs of injury.      Right lower le+ Edema present.      Left lower le+ Edema present.      Comments: Muscle strength 5/5 in all muscle groups  bilateral.  No tenderness nor crepitation with ROM of foot/ankle joints bilateral.  Pain with palpation to a small superficial thrombosed vein along the distal medial aspect of the Lt. lower leg. (-) calf pain , Vale's sign, or Gagan's sign to said limb.    Skin:     Capillary Refill: Capillary refill takes 2 to 3 seconds.      Findings: Erythema present. No bruising, ecchymosis, signs of injury, laceration, lesion, petechiae, rash or wound.      Comments: Increased pedal turgor noted to bilateral LE.  Hyperpigmentation noted to a prior site of thrombophlebitis along the distal medial aspect of the Rt. Lower leg.  Small nodule noted along one of the superficial veins along the distal medial aspect of the Lt. Lower leg.  Increased calor and erythema noted to the site.  No obvious skin breakdown noted.  Toenails x 10 appear normotrophic.       Neurological:      General: No focal deficit present.      Mental Status: She is alert.      Sensory: No sensory deficit.      Motor: No weakness or atrophy.      Comments: Light touch is intact bilateral.             Assessment:      Encounter Diagnoses   Name Primary?    Venous insufficiency Yes    Peripheral edema     Thrombophlebitis of superficial veins of left lower extremity      Plan:     Carina was seen today for ankle pain.    Diagnoses and all orders for this visit:    Venous insufficiency    Peripheral edema    Thrombophlebitis of superficial veins of left lower extremity      I counseled the patient on her conditions, their implications and medical management.    Based on today's exam, thrombophlebitis is noted to the one of the superficial veins along the Lt. Medial lower leg.  Resolution of a similar issue to the medial aspect of the Rt. Lower leg.    Reviewed today's ultrasound which was negative for a DVT.    Advised to begin elevating the limbs while at rest.    Fitted and dispensed tubigrip stockings to use with weight bearing and while the feet are held in a  dependent position while seated.    Advise to begin applying heat to the affected area up to 20 minutes BID x 2 weeks.    RTC if pain symptoms have failed to resolve within the next two weeks.    Omari Gonzalez DPM

## 2023-12-14 ENCOUNTER — TELEPHONE (OUTPATIENT)
Dept: FAMILY MEDICINE | Facility: CLINIC | Age: 83
End: 2023-12-14
Payer: MEDICARE

## 2023-12-14 NOTE — TELEPHONE ENCOUNTER
----- Message from Theresa Garcia sent at 12/14/2023  1:32 PM CST -----  Regarding: Call back  Type:  Needs Medical Advice    Who Called: [facility]    Would the patient rather a call back or a response via [facility]? Call back    Best Call Back Number: [phone] fax     Additional Information: Needs plan of care for pt to be signed. Thank you

## 2023-12-14 NOTE — TELEPHONE ENCOUNTER
Left a message at [facility] - order for 11/30/23 plan of care faxed today. Others are on Dr. Joe deshpande for signatures.

## 2024-01-02 ENCOUNTER — HOSPITAL ENCOUNTER (OUTPATIENT)
Dept: RADIOLOGY | Facility: HOSPITAL | Age: 84
Discharge: HOME | End: 2024-01-02
Attending: INTERNAL MEDICINE
Payer: MEDICARE

## 2024-01-02 DIAGNOSIS — Z78.0 ASYMPTOMATIC MENOPAUSAL STATE: ICD-10-CM

## 2024-01-02 PROCEDURE — 77080 DXA BONE DENSITY AXIAL: CPT | Mod: TC,PO

## 2024-01-02 PROCEDURE — 77080 DXA BONE DENSITY AXIAL: CPT | Mod: 26,,, | Performed by: RADIOLOGY

## 2024-01-03 ENCOUNTER — TELEPHONE (OUTPATIENT)
Dept: FAMILY MEDICINE | Facility: CLINIC | Age: 84
End: 2024-01-03
Payer: MEDICARE

## 2024-01-03 NOTE — TELEPHONE ENCOUNTER
Please let her know that her labs look good.    Her bone density showed osteoporosis. We will discuss further at an appt    Please schedule appt with  me for chronic care f/u this month or early Feb    Thanks

## 2024-01-04 NOTE — TELEPHONE ENCOUNTER
"Spoke to pt and informed her that Dr. Diaz said "her labs look good and her bone density showed osteoporosis. We will discuss further at an appt".  Pt verbalizes understanding.  Appt with Dr. Diaz scheduled.  "

## 2024-01-09 ENCOUNTER — OFFICE VISIT (OUTPATIENT)
Dept: FAMILY MEDICINE | Facility: CLINIC | Age: 84
End: 2024-01-09
Payer: MEDICARE

## 2024-01-09 VITALS
WEIGHT: 100.63 LBS | DIASTOLIC BLOOD PRESSURE: 70 MMHG | OXYGEN SATURATION: 95 % | BODY MASS INDEX: 19.75 KG/M2 | HEIGHT: 60 IN | HEART RATE: 67 BPM | TEMPERATURE: 98 F | SYSTOLIC BLOOD PRESSURE: 130 MMHG

## 2024-01-09 DIAGNOSIS — N39.3 STRESS INCONTINENCE: ICD-10-CM

## 2024-01-09 DIAGNOSIS — L40.8 SEBOPSORIASIS: ICD-10-CM

## 2024-01-09 DIAGNOSIS — M79.7 FIBROMYALGIA: ICD-10-CM

## 2024-01-09 DIAGNOSIS — I48.0 PAROXYSMAL ATRIAL FIBRILLATION: Primary | ICD-10-CM

## 2024-01-09 DIAGNOSIS — Z12.31 ENCOUNTER FOR SCREENING MAMMOGRAM FOR MALIGNANT NEOPLASM OF BREAST: ICD-10-CM

## 2024-01-09 DIAGNOSIS — M81.0 AGE-RELATED OSTEOPOROSIS WITHOUT CURRENT PATHOLOGICAL FRACTURE: ICD-10-CM

## 2024-01-09 DIAGNOSIS — F41.9 ANXIETY: ICD-10-CM

## 2024-01-09 DIAGNOSIS — Z95.0 PACEMAKER: ICD-10-CM

## 2024-01-09 DIAGNOSIS — I10 PRIMARY HYPERTENSION: ICD-10-CM

## 2024-01-09 DIAGNOSIS — J43.9 PULMONARY EMPHYSEMA, UNSPECIFIED EMPHYSEMA TYPE: ICD-10-CM

## 2024-01-09 DIAGNOSIS — I87.2 VENOUS INSUFFICIENCY: ICD-10-CM

## 2024-01-09 DIAGNOSIS — I80.9 THROMBOPHLEBITIS: ICD-10-CM

## 2024-01-09 DIAGNOSIS — M25.50 POLYARTHRALGIA: ICD-10-CM

## 2024-01-09 DIAGNOSIS — E03.9 HYPOTHYROIDISM, UNSPECIFIED TYPE: ICD-10-CM

## 2024-01-09 DIAGNOSIS — K21.9 GASTROESOPHAGEAL REFLUX DISEASE WITHOUT ESOPHAGITIS: ICD-10-CM

## 2024-01-09 DIAGNOSIS — I49.5 SSS (SICK SINUS SYNDROME): ICD-10-CM

## 2024-01-09 PROCEDURE — 99214 OFFICE O/P EST MOD 30 MIN: CPT | Mod: S$GLB,,, | Performed by: INTERNAL MEDICINE

## 2024-01-09 RX ORDER — KETOCONAZOLE 20 MG/ML
SHAMPOO, SUSPENSION TOPICAL
Qty: 120 ML | Refills: 6 | Status: SHIPPED | OUTPATIENT
Start: 2024-01-11

## 2024-01-09 NOTE — PROGRESS NOTES
Subjective:       Patient ID: Carina Elise is a 83 y.o. female.    Medication List with Changes/Refills   Current Medications    ALUMINUM-MAGNESIUM HYDROXIDE-SIMETHICONE (MAALOX) 200-200-20 MG/5 ML SUSP    Take 30 mLs by mouth 4 (four) times daily before meals and nightly.    ASPIRIN (ECOTRIN) 81 MG EC TABLET    Take 81 mg by mouth once daily.    CALCIUM CARBONATE (OS-TYRELL) 500 MG CALCIUM (1,250 MG) CHEWABLE TABLET    Take 1 tablet by mouth once daily.    CALCIUM CARBONATE (TUMS ORAL)    Tums   PRN    CARBOXYMETHYLCELLULOSE SODIUM (ARTIFICIAL TEARS, CMC,) 1 % DROP    Apply to eye.    FAMOTIDINE (PEPCID) 40 MG TABLET    Take 1 tablet (40 mg total) by mouth once daily.    LACTOBACILLUS RHAMNOSUS GG (CULTURELLE) 10 BILLION CELL CAPSULE    Take 1 capsule by mouth once daily.    LEVALBUTEROL (XOPENEX) 1.25 MG/3 ML NEBULIZER SOLUTION    Take 3 mLs (1.25 mg total) by nebulization every 4 (four) hours as needed for Wheezing. Rescue    LEVOTHYROXINE (SYNTHROID) 112 MCG TABLET    Take 1 tablet (112 mcg total) by mouth once daily.    LIOTHYRONINE (CYTOMEL) 5 MCG TAB    TAKE 1 TABLET DAILY    LYSINE ORAL    Take 500 mg by mouth once daily.    METOPROLOL SUCCINATE (TOPROL-XL) 25 MG 24 HR TABLET    Take 12.5 mg by mouth once daily. Take half tab daily    PROPAFENONE (RHTHYMOL) 150 MG TAB    Take 150 mg by mouth 2 (two) times daily.    SIMETHICONE (MYLICON) 40 MG/0.6 ML DROPS    Take 40 mg by mouth 4 (four) times daily as needed.    TUMERIC-GING-OLIVE-OREG-CAPRYL 100 MG-150 MG- 50 MG-150 MG CAP    Take by mouth.    VITAMIN D (VITAMIN D3) 1000 UNITS TAB    Take 1,000 Units by mouth.   Changed and/or Refilled Medications    Modified Medication Previous Medication    KETOCONAZOLE (NIZORAL) 2 % SHAMPOO ketoconazole (NIZORAL) 2 % shampoo       Apply topically twice a week.    Apply topically twice a week.       Chief Complaint: Hip Injury  She is here today to f/u on chronic medical issues.      She has emphysema and is followed by  pulmonary. She was last seen on 3/2023.  She has chronic shortness of breath but denies wheezing or coughing. She has xopenex but had not needed to use. CXR on 1/2023 was clear with vascular calcifications. She denies any active symptoms.       She as paroxysmal Afib s/p RFA in 2011.  She has a pacemaker due SSS and denies any recurrent Afib since ablation. No known CAD or hypertension. She is taking rhthymol 150 mg bid  and metoprolol 12.5 mg daily. She is anticoagulated with aspirin daily. She is managed by Dr. Lao in cardiology.  She had a stress echo per patient that was reassuring.  Lipids on 2/2022 were 168/74/69/84. She denies any chest pain.      She has venous insufficiency with intermittent lower leg swelling. She had venous ablation surgery in the past. She was seen by podiatry on 12/2023 and tubigrip stockings were recommended. He did note thrombophlebitis in the veins of the left medial lower leg.      She has hypothyroid and is taking levothyroxine 112 mcg daily and cytomel 5 mcg daily.  She feels this is well controlled and TSH on 1/2024 was normal.      She has acid reflux with dyspepsia and unable to take PPI due to intolerance in the past. EGD on 2/2023 was normal with biopsy showing chronic gastritis with GERD.  Esophogram on 12/2022 showed esophageal dysmotility.  She was seen by GI on 1/2023 and started on famotidine 40 mg qhs. She does feel her symptoms have improved after her EGD and only uses famotidine PRN.      She does complain of mild stress incontinence with laughing, sneezing or bending forward.  She feels this is stable.      Recall there was concern for the diagnosis of temporal arteritis due to elevated inflammatory markers, headaches and weakness.  She was noted to have jaw pain due to chewing fatigue but no claudication.  She had refused the TA biopsy in the past.  Labs showed ESR of 18 with CRP of 51.  Normal u/s of the temporal arteries.  Normal CBC, CMP and SPEP. Her  headaches have resolved and she denies any jaw pain. She continues to have diffuse muscle pain but feels it is improved with starting PT.  Her last inflammatory markers on 5/2023 were normal with ESR 15 and CRP 7.8. 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  She was given reassurance and advised to f/u in 6 months.      She has fibromyalgia for many years that will flare with muscle aches and weakness. She is treated with regular exercise through physical therapy. She feels this has improved and she is back to baseline. She is using tumeric for inflammation.      She has intermittent anxiety but denies depression. She has had for many years and has only tried xanax which she did not like. She is sleeping well.  She did have increased in symptoms around the holidays and she has anxiety about travel because she no longer is driving. She is using [facility] services but they are unreliable. She does not want treatment.      She has osteoporosis with DEXA on 1/2024 showing fracture risk  of 9.7%, hip fracture risk of 3.8% (Tv -3.1, Tf -2.4). She does not want treatment.     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  She has ketoconazole shampoo but does not use. She is using a topical ointment.  She is losing hair in the areas that are effected.     She lives alone and her daughter is involved with her care. She exercises regularly with PT and injured her left hip during one of the exercises about 2 weeks ago. This is improving. She eats healthy. Her weight has been stable since her initial weight loss last year. She is eating 3 meals a day with snacks and boost.  She is improved with her appetite and swallowing after her EGD.      Colonoscopy---refused   Mammogram---3/2023 neg   DEXA-----1/2024 osteoporosis with fracture risk  of 9.7%, hip fracture risk of 3.8% (Tv -3.1, Tf -2.4)  Tdap---unknown  Influenza vaccine---refused   Pneumovax 23----refused  Prevnar  13----10/2011  Shingrex vaccine-----refused   Covid vaccine---refused     Review of Systems   Constitutional:  Negative for appetite change, fatigue, fever and unexpected weight change.   HENT:  Negative for congestion, ear pain, hearing loss, sore throat and trouble swallowing.    Eyes:  Negative for pain and visual disturbance.   Respiratory:  Negative for cough, chest tightness, shortness of breath and wheezing.    Cardiovascular:  Positive for leg swelling. Negative for chest pain and palpitations.   Gastrointestinal:  Negative for abdominal pain, blood in stool, constipation, diarrhea, nausea and vomiting.   Endocrine: Negative for polyuria.   Genitourinary:  Negative for dysuria and hematuria.   Musculoskeletal:  Positive for arthralgias. Negative for back pain and myalgias.   Skin:  Negative for rash.   Neurological:  Negative for dizziness, weakness, numbness and headaches.   Hematological:  Does not bruise/bleed easily.   Psychiatric/Behavioral:  Negative for dysphoric mood, sleep disturbance and suicidal ideas. The patient is nervous/anxious.        Objective:      Vitals:    01/09/24 1045   BP: 130/70   BP Location: Left arm   Patient Position: Sitting   BP Method: Medium (Manual)   Pulse: 67   Temp: 98.2 °F (36.8 °C)   SpO2: 95%   Weight: 45.7 kg (100 lb 10.2 oz)   Height: 5' (1.524 m)     Body mass index is 19.65 kg/m².  Physical Exam    General appearance: No acute distress, cooperative  Eyes: PERRL, EOMI, conjunctiva clear  Ears: normal external ear and pinna, tm clear without drainage, canals clear  Nose: Normal mucosa without drainage  Throat: no exudates or erythema, tonsils not enlarged  Mouth: no sores or lesions, moist mucous membranes  Neck: FROM, soft, supple, no thyromegaly, no bruits  Lymph: no anterior or posterior cervical adenopathy  Heart::  Regular rate and rhythm, no murmur  Lung: Clear to ascultation bilaterally, no wheezing, no rales, no rhonchi, no distress  Abdomen: Soft,  nontender, no distention, no hepatosplenomegaly, bowel sounds normal, no guarding, no rebound, no peritoneal signs  Skin: thick yellowish scale on scale with hair loss   Extremities: no edema, tenderness on palpation of lower legs   Neuro: CN 2-12 intact, 5/5 muscle strength upper and lower extremity bilaterally, 2+ DTRs UE and LE bilaterally, normal gait  Peripheral pulses: 1+ pedal pulses bilaterally  Musculoskeletal: FROM, good strenth, no tenderness  Joint: normal appearance, no swelling, no warmth, no deformity in all joints    Assessment:       1. Paroxysmal atrial fibrillation    2. SSS (sick sinus syndrome)    3. Pacemaker    4. Primary hypertension    5. Venous insufficiency    6. Thrombophlebitis    7. Pulmonary emphysema, unspecified emphysema type    8. Hypothyroidism, unspecified type    9. Gastroesophageal reflux disease without esophagitis    10. Stress incontinence    11. Fibromyalgia    12. Polyarthralgia    13. Anxiety    14. Sebopsoriasis    15. Age-related osteoporosis without current pathological fracture    16. Encounter for screening mammogram for malignant neoplasm of breast        Plan:       Paroxysmal atrial fibrillation  NSR today on exam. She continues on rhythmol and aspirin. She is followed by outside cardiology    SSS (sick sinus syndrome) with Pacemaker  She is doing very well and continues to have pacer managed by outside cardiology    Primary hypertension  Well controlled and continue current regimen.   -     Comprehensive Metabolic Panel; Future; Expected date: 01/09/2024  -     Lipid Panel; Future; Expected date: 01/09/2024    Venous insufficiency with Thrombophlebitis  Uncontrolled but managed by podiatry. She is having trouble using the stockings due to her recent hip pain but prior to the injury she was using as instructed.     Pulmonary emphysema, unspecified emphysema type  Stable and no active symptoms.    Hypothyroidism, unspecified type  Good control on this dose of  levothyroxine and cytomel  -     TSH; Future; Expected date: 01/09/2024    Gastroesophageal reflux disease without esophagitis  She still has episodes of dysphagia but mild.  Continue famotidine PRN. Unable to tolerate PPI    Stress incontinence  Mild and continue to monitor    Fibromyalgia  Stable and no recent flares    Polyarthralgia  Stable at baseline and improved with tumeric    Anxiety  Increased symptoms due to travel and holidays. She feels this is improving and does not want treatment.     Sebopsoriasis  Uncontrolled and encouraged her to use shampoo twice a week to help reduce hair loss.  -     ketoconazole (NIZORAL) 2 % shampoo; Apply topically twice a week.  Dispense: 120 mL; Refill: 6    Age-related osteoporosis without current pathological fracture  Uncontrolled but she does not want treatment.     Encounter for screening mammogram for malignant neoplasm of breast  -     Mammo Digital Screening Bilat w/ [facility]; Future; Expected date: 03/18/2024    Follow up in about 6 months (around 7/9/2024) for chronic medical issues.

## 2024-04-24 NOTE — TELEPHONE ENCOUNTER
No care due was identified.  [facility] Embedded Care Due Messages. Reference number: [id number].   4/24/2024 4:44:09 PM CDT

## 2024-04-25 RX ORDER — LIOTHYRONINE SODIUM 5 UG/1
TABLET ORAL
Qty: 90 TABLET | Refills: 2 | Status: SHIPPED | OUTPATIENT
Start: 2024-04-25

## 2024-04-25 NOTE — TELEPHONE ENCOUNTER
Refill Decision Note   Carina Elise  is requesting a refill authorization.  Brief Assessment and Rationale for Refill:  Approve     Medication Therapy Plan:         Alert overridden per protocol: Yes   Comments:     Note composed:7:44 AM 04/25/2024

## 2024-05-29 DIAGNOSIS — E03.9 HYPOTHYROIDISM, UNSPECIFIED TYPE: ICD-10-CM

## 2024-05-29 NOTE — TELEPHONE ENCOUNTER
No care due was identified.  [facility] Embedded Care Due Messages. Reference number: [id number].   5/29/2024 5:40:44 PM CDT

## 2024-05-30 RX ORDER — LEVOTHYROXINE SODIUM 112 UG/1
112 TABLET ORAL
Qty: 90 TABLET | Refills: 2 | Status: SHIPPED | OUTPATIENT
Start: 2024-05-30

## 2024-05-30 NOTE — TELEPHONE ENCOUNTER
Refill Decision Note   Carina Elise  is requesting a refill authorization.  Brief Assessment and Rationale for Refill:  Approve     Medication Therapy Plan:         Alert overridden per protocol: Yes   Comments:     Note composed:5:10 AM 05/30/2024

## 2024-07-02 ENCOUNTER — LAB VISIT (OUTPATIENT)
Dept: LAB | Facility: HOSPITAL | Age: 84
End: 2024-07-02
Attending: INTERNAL MEDICINE
Payer: MEDICARE

## 2024-07-02 DIAGNOSIS — I10 PRIMARY HYPERTENSION: ICD-10-CM

## 2024-07-02 DIAGNOSIS — E03.9 HYPOTHYROIDISM, UNSPECIFIED TYPE: ICD-10-CM

## 2024-07-02 LAB
ALBUMIN SERPL BCP-MCNC: 4 G/DL (ref 3.5–5.2)
ALP SERPL-CCNC: 93 U/L (ref 55–135)
ALT SERPL W/O P-5'-P-CCNC: 16 U/L (ref 10–44)
ANION GAP SERPL CALC-SCNC: 14 MMOL/L (ref 8–16)
AST SERPL-CCNC: 21 U/L (ref 10–40)
BILIRUB SERPL-MCNC: 0.5 MG/DL (ref 0.1–1)
BUN SERPL-MCNC: 20 MG/DL (ref 8–23)
CALCIUM SERPL-MCNC: 9.8 MG/DL (ref 8.7–10.5)
CHLORIDE SERPL-SCNC: 104 MMOL/L (ref 95–110)
CHOLEST SERPL-MCNC: 153 MG/DL (ref 120–199)
CHOLEST/HDLC SERPL: 2.3 {RATIO} (ref 2–5)
CO2 SERPL-SCNC: 23 MMOL/L (ref 23–29)
CREAT SERPL-MCNC: 0.9 MG/DL (ref 0.5–1.4)
EST. GFR  (NO RACE VARIABLE): >60 ML/MIN/1.73 M^2
GLUCOSE SERPL-MCNC: 89 MG/DL (ref 70–110)
HDLC SERPL-MCNC: 67 MG/DL (ref 40–75)
HDLC SERPL: 43.8 % (ref 20–50)
LDLC SERPL CALC-MCNC: 75.6 MG/DL (ref 63–159)
NONHDLC SERPL-MCNC: 86 MG/DL
POTASSIUM SERPL-SCNC: 4.3 MMOL/L (ref 3.5–5.1)
PROT SERPL-MCNC: 7.7 G/DL (ref 6–8.4)
SODIUM SERPL-SCNC: 141 MMOL/L (ref 136–145)
TRIGL SERPL-MCNC: 52 MG/DL (ref 30–150)
TSH SERPL DL<=0.005 MIU/L-ACNC: 1.26 UIU/ML (ref 0.4–4)

## 2024-07-02 PROCEDURE — 36415 COLL VENOUS BLD VENIPUNCTURE: CPT | Mod: PO | Performed by: INTERNAL MEDICINE

## 2024-07-02 PROCEDURE — 80061 LIPID PANEL: CPT | Performed by: INTERNAL MEDICINE

## 2024-07-02 PROCEDURE — 84443 ASSAY THYROID STIM HORMONE: CPT | Performed by: INTERNAL MEDICINE

## 2024-07-02 PROCEDURE — 80053 COMPREHEN METABOLIC PANEL: CPT | Performed by: INTERNAL MEDICINE

## 2024-07-24 ENCOUNTER — OFFICE VISIT (OUTPATIENT)
Dept: FAMILY MEDICINE | Facility: CLINIC | Age: 84
End: 2024-07-24
Payer: MEDICARE

## 2024-07-24 VITALS
BODY MASS INDEX: 18.9 KG/M2 | RESPIRATION RATE: 18 BRPM | DIASTOLIC BLOOD PRESSURE: 80 MMHG | OXYGEN SATURATION: 96 % | HEIGHT: 60 IN | WEIGHT: 96.25 LBS | TEMPERATURE: 98 F | HEART RATE: 75 BPM | SYSTOLIC BLOOD PRESSURE: 144 MMHG

## 2024-07-24 DIAGNOSIS — J43.9 PULMONARY EMPHYSEMA, UNSPECIFIED EMPHYSEMA TYPE: ICD-10-CM

## 2024-07-24 DIAGNOSIS — E03.9 HYPOTHYROIDISM, UNSPECIFIED TYPE: ICD-10-CM

## 2024-07-24 DIAGNOSIS — M81.0 AGE-RELATED OSTEOPOROSIS WITHOUT CURRENT PATHOLOGICAL FRACTURE: ICD-10-CM

## 2024-07-24 DIAGNOSIS — K21.9 GASTROESOPHAGEAL REFLUX DISEASE WITHOUT ESOPHAGITIS: ICD-10-CM

## 2024-07-24 DIAGNOSIS — L40.8 SEBOPSORIASIS: ICD-10-CM

## 2024-07-24 DIAGNOSIS — Z95.0 PACEMAKER: ICD-10-CM

## 2024-07-24 DIAGNOSIS — N39.3 STRESS INCONTINENCE: ICD-10-CM

## 2024-07-24 DIAGNOSIS — I10 PRIMARY HYPERTENSION: ICD-10-CM

## 2024-07-24 DIAGNOSIS — F41.9 ANXIETY: ICD-10-CM

## 2024-07-24 DIAGNOSIS — I80.9 THROMBOPHLEBITIS: ICD-10-CM

## 2024-07-24 DIAGNOSIS — M79.7 FIBROMYALGIA: ICD-10-CM

## 2024-07-24 DIAGNOSIS — I49.5 SSS (SICK SINUS SYNDROME): ICD-10-CM

## 2024-07-24 DIAGNOSIS — I87.2 VENOUS INSUFFICIENCY: ICD-10-CM

## 2024-07-24 DIAGNOSIS — M25.50 POLYARTHRALGIA: ICD-10-CM

## 2024-07-24 DIAGNOSIS — I48.0 PAROXYSMAL ATRIAL FIBRILLATION: Primary | ICD-10-CM

## 2024-07-24 PROCEDURE — 99214 OFFICE O/P EST MOD 30 MIN: CPT | Mod: S$GLB,,, | Performed by: INTERNAL MEDICINE

## 2024-07-24 PROCEDURE — G2211 COMPLEX E/M VISIT ADD ON: HCPCS | Mod: S$GLB,,, | Performed by: INTERNAL MEDICINE

## 2024-07-24 RX ORDER — DIAZEPAM 5 MG/1
TABLET ORAL
COMMUNITY

## 2024-07-24 RX ORDER — METOPROLOL TARTRATE 25 MG/1
TABLET, FILM COATED ORAL
COMMUNITY
Start: 2024-01-10 | End: 2024-07-24

## 2024-07-24 NOTE — PROGRESS NOTES
Subjective:       Patient ID: Carina Elise is a 84 y.o. female.    Medication List with Changes/Refills   Current Medications    ALUMINUM-MAGNESIUM HYDROXIDE-SIMETHICONE (MAALOX) 200-200-20 MG/5 ML SUSP    Take 30 mLs by mouth 4 (four) times daily before meals and nightly.    ASPIRIN (ECOTRIN) 81 MG EC TABLET    Take 81 mg by mouth once daily.    CALCIUM CARBONATE (OS-TYRELL) 500 MG CALCIUM (1,250 MG) CHEWABLE TABLET    Take 1 tablet by mouth once daily.    CALCIUM CARBONATE (TUMS ORAL)    Tums   PRN    CARBOXYMETHYLCELLULOSE SODIUM (ARTIFICIAL TEARS, CMC,) 1 % DROP    Apply to eye.    DIAZEPAM (VALIUM) 5 MG TABLET        FAMOTIDINE (PEPCID) 40 MG TABLET    Take 1 tablet (40 mg total) by mouth once daily.    KETOCONAZOLE (NIZORAL) 2 % SHAMPOO    Apply topically twice a week.    LACTOBACILLUS RHAMNOSUS GG (CULTURELLE) 10 BILLION CELL CAPSULE    Take 1 capsule by mouth once daily.    LEVALBUTEROL (XOPENEX) 1.25 MG/3 ML NEBULIZER SOLUTION    Take 3 mLs (1.25 mg total) by nebulization every 4 (four) hours as needed for Wheezing. Rescue    LIOTHYRONINE (CYTOMEL) 5 MCG TAB    TAKE 1 TABLET DAILY    LYSINE ORAL    Take 500 mg by mouth once daily.    METOPROLOL SUCCINATE (TOPROL-XL) 25 MG 24 HR TABLET    Take 12.5 mg by mouth once daily. Take half tab daily    PROPAFENONE (RHTHYMOL) 150 MG TAB    Take 150 mg by mouth 2 (two) times daily.    SIMETHICONE (MYLICON) 40 MG/0.6 ML DROPS    Take 40 mg by mouth 4 (four) times daily as needed.    SYNTHROID 112 MCG TABLET    TAKE 1 TABLET DAILY    TUMERIC-GING-OLIVE-OREG-CAPRYL 100 MG-150 MG- 50 MG-150 MG CAP    Take by mouth.    VITAMIN D (VITAMIN D3) 1000 UNITS TAB    Take 1,000 Units by mouth.   Discontinued Medications    METOPROLOL TARTRATE (LOPRESSOR) 25 MG TABLET           Chief Complaint: Follow-up  She is here today to f/u on chronic medical issues.      She has emphysema and is followed by pulmonary. She was last seen on 5/2024.  She has chronic shortness of breath but denies  wheezing or coughing. She has xopenex but had not needed to use. CXR on 1/2023 was clear with vascular calcifications. She denies any active symptoms.       She as paroxysmal Afib s/p RFA in 2011.  She has a pacemaker due SSS and denies any recurrent Afib since ablation. No known CAD or hypertension. She is taking rhthymol 150 mg bid  and metoprolol 12.5 mg daily. She is anticoagulated with aspirin daily. She is managed by Dr. Lao in cardiology.  She had a stress echo per patient that was reassuring.  Lipids on 7/2024 were 153/52/67/75. She denies any chest pain.      She has venous insufficiency with intermittent lower leg swelling. She had venous ablation surgery in the past. She was seen by podiatry on 12/2023 and tubigrip stockings were recommended. He did note thrombophlebitis in the veins of the left medial lower leg. She is scheduled for vein ablation of left leg in one week with Dr. Lao.      She has hypothyroid and is taking levothyroxine 112 mcg daily and cytomel 5 mcg daily.  She feels this is well controlled and TSH on 7/2024 was normal.      She has acid reflux with dyspepsia and unable to take PPI due to intolerance in the past. EGD on 2/2023 was normal with biopsy showing chronic gastritis with GERD.  Esophogram on 12/2022 showed esophageal dysmotility.  She was seen by GI on 1/2023 and started on famotidine 40 mg qhs. She does feel her symptoms have improved after her EGD and only uses famotidine PRN.      She does complain of mild stress incontinence with laughing, sneezing or bending forward.  She feels this is stable.      Recall there was concern for the diagnosis of temporal arteritis due to elevated inflammatory markers, headaches and weakness.  She was noted to have jaw pain due to chewing fatigue but no claudication.  She had refused the TA biopsy in the past.  Labs showed ESR of 18 with CRP of 51.  Normal u/s of the temporal arteries.  Normal CBC, CMP and SPEP. Her headaches have  resolved and she denies any jaw pain. She continues to have diffuse muscle pain but feels it is improved with starting PT.  Her last inflammatory markers on 5/2023 were normal with ESR 15 and CRP 7.8. 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  She was given reassurance and advised to f/u in 6 months.      She has fibromyalgia for many years that will flare with muscle aches and weakness. She is treated with regular exercise through physical therapy. She does report mild worsening over the last couple days due to weather changes.  She is using tumeric for inflammation.      She has intermittent anxiety but denies depression. She has had for many years and has only tried xanax which she did not like. She is sleeping well.  She did have increased in symptoms around the holidays and she has anxiety about travel because she no longer is driving. She is using [facility] services but they are unreliable. She does not want treatment.      She has osteoporosis with DEXA on 1/2024 showing fracture risk  of 9.7%, hip fracture risk of 3.8% (Tv -3.1, Tf -2.4). She does not want treatment.      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  She has ketoconazole shampoo but does not use. She is using a topical ointment.  She is losing hair in the areas that are effected.     She lives alone and her daughter is involved with her care. She exercises regularly with PT to help with strength and balance. This is improving. She eats healthy. Her weight has been stable since her initial weight loss last year. She is eating 3 meals a day with snacks and boost.  She is improved with her appetite and swallowing after her EGD.      Colonoscopy---refused   Mammogram---3/2023 neg ----refused further testing   DEXA-----1/2024 osteoporosis with fracture risk  of 9.7%, hip fracture risk of 3.8% (Tv -3.1, Tf -2.4)  Tdap---unknown  Influenza vaccine---refused   Pneumovax 23----refused  Prevnar  13----10/2011  Shingrex vaccine-----refused   Covid vaccine---refused   RSV vaccine----refused      Review of Systems   Constitutional:  Positive for fatigue. Negative for appetite change, fever and unexpected weight change.   HENT:  Positive for postnasal drip and trouble swallowing. Negative for congestion, ear pain, hearing loss and sore throat.    Eyes:  Negative for pain and visual disturbance.   Respiratory:  Positive for shortness of breath. Negative for cough, chest tightness and wheezing.    Cardiovascular:  Negative for chest pain, palpitations and leg swelling.   Gastrointestinal:  Positive for constipation. Negative for abdominal pain, blood in stool, diarrhea, nausea and vomiting.   Endocrine: Negative for polyuria.   Genitourinary:  Negative for dysuria and hematuria.   Musculoskeletal:  Positive for myalgias. Negative for arthralgias and back pain.   Skin:  Negative for rash.   Allergic/Immunologic: Positive for environmental allergies.   Neurological:  Negative for dizziness, weakness, numbness and headaches.   Hematological:  Does not bruise/bleed easily.   Psychiatric/Behavioral:  Positive for dysphoric mood. Negative for sleep disturbance and suicidal ideas. The patient is nervous/anxious.        Objective:      Vitals:    07/24/24 1031   BP: (!) 144/80   BP Location: Right arm   Patient Position: Sitting   BP Method: Small (Manual)   Pulse: 75   Resp: 18   Temp: 98.1 °F (36.7 °C)   SpO2: 96%   Weight: 43.6 kg (96 lb 3.7 oz)   Height: 5' (1.524 m)     Body mass index is 18.79 kg/m².  Physical Exam    General appearance: No acute distress, cooperative  Eyes: PERRL, EOMI, conjunctiva clear  Ears: normal external ear and pinna, tm clear without drainage, canals clear  Nose: Normal mucosa without drainage  Throat: no exudates or erythema, tonsils not enlarged  Mouth: no sores or lesions, moist mucous membranes  Neck: FROM, soft, supple, no thyromegaly, no bruits  Lymph: no anterior or posterior  cervical adenopathy  Heart::  Regular rate and rhythm, no murmur  Lung: Clear to ascultation bilaterally, no wheezing, no rales, no rhonchi, no distress  Abdomen: Soft, nontender, no distention, no hepatosplenomegaly, bowel sounds normal, no guarding, no rebound, no peritoneal signs  Skin: no rashes, no lesions  Extremities: no edema  Neuro: CN 2-12 intact, 5/5 muscle strength upper and lower extremity bilaterally, 2+ DTRs UE and LE bilaterally, normal gait  Peripheral pulses: 1+ pedal pulses bilaterally, tender warm erythematous veins on left > right medial lower leg  Musculoskeletal: FROM, good strenth, no tenderness  Joint: normal appearance, no swelling, no warmth, no deformity in all joints    Assessment:       1. Paroxysmal atrial fibrillation    2. SSS (sick sinus syndrome)    3. Pacemaker    4. Primary hypertension    5. Venous insufficiency    6. Thrombophlebitis    7. Pulmonary emphysema, unspecified emphysema type    8. Hypothyroidism, unspecified type    9. Gastroesophageal reflux disease without esophagitis    10. Stress incontinence    11. Fibromyalgia    12. Polyarthralgia    13. Anxiety    14. Sebopsoriasis    15. Age-related osteoporosis without current pathological fracture        Plan:       Paroxysmal atrial fibrillation  SHe continues on rhythmol and metoprolol and aspirin    SSS (sick sinus syndrome) s/p Pacemaker  Stable and continue to follow with cardiology    Primary hypertension  Well controlled and continue current regimen.   -     Comprehensive Metabolic Panel; Future; Expected date: 07/24/2024  -     CBC Auto Differential; Future; Expected date: 07/24/2024  -     TSH; Future; Expected date: 07/24/2024    Venous insufficiency with Thrombophlebitis  Uncontrolled and scheduled for ablation next week on left and then will have right done    Pulmonary emphysema, unspecified emphysema type  Stable and no active symptoms.    Hypothyroidism, unspecified type  Good control on levothyroxine and  cytomel    Gastroesophageal reflux disease without esophagitis  Uncontrolled and encouraged her to take famotidine    Stress incontinence  Mild and stable    Fibromyalgia  Mild increase in symptoms. Continue to monitor    Polyarthralgia  Improved with tumeric    Anxiety  Stable at baseline    Sebopsoriasis  Stable and no complaints today    Age-related osteoporosis without current pathological fracture  She does not want treatment.     Follow up in about 6 months (around 1/24/2025) for chronic medical issues.

## 2024-08-21 ENCOUNTER — OFFICE VISIT (OUTPATIENT)
Dept: HOME HEALTH SERVICES | Facility: CLINIC | Age: 84
End: 2024-08-21
Payer: MEDICARE

## 2024-08-21 VITALS
OXYGEN SATURATION: 96 % | HEIGHT: 60 IN | SYSTOLIC BLOOD PRESSURE: 152 MMHG | BODY MASS INDEX: 18.65 KG/M2 | HEART RATE: 76 BPM | DIASTOLIC BLOOD PRESSURE: 77 MMHG | WEIGHT: 95 LBS

## 2024-08-21 DIAGNOSIS — I83.019 VENOUS STASIS ULCERS OF BOTH LOWER EXTREMITIES: ICD-10-CM

## 2024-08-21 DIAGNOSIS — L97.919 VENOUS STASIS ULCERS OF BOTH LOWER EXTREMITIES: ICD-10-CM

## 2024-08-21 DIAGNOSIS — I83.029 VENOUS STASIS ULCERS OF BOTH LOWER EXTREMITIES: ICD-10-CM

## 2024-08-21 DIAGNOSIS — J43.2 CENTRIACINAR EMPHYSEMA: ICD-10-CM

## 2024-08-21 DIAGNOSIS — I49.5 SICK SINUS SYNDROME: ICD-10-CM

## 2024-08-21 DIAGNOSIS — I87.2 PERIPHERAL VENOUS INSUFFICIENCY: ICD-10-CM

## 2024-08-21 DIAGNOSIS — K22.4 ESOPHAGEAL DYSMOTILITY: ICD-10-CM

## 2024-08-21 DIAGNOSIS — I35.1 NONRHEUMATIC AORTIC VALVE INSUFFICIENCY: ICD-10-CM

## 2024-08-21 DIAGNOSIS — I10 ESSENTIAL HYPERTENSION: ICD-10-CM

## 2024-08-21 DIAGNOSIS — I48.0 PAROXYSMAL ATRIAL FIBRILLATION: ICD-10-CM

## 2024-08-21 DIAGNOSIS — Z95.0 PACEMAKER: ICD-10-CM

## 2024-08-21 DIAGNOSIS — Z00.00 ENCOUNTER FOR PREVENTIVE HEALTH EXAMINATION: Primary | ICD-10-CM

## 2024-08-21 DIAGNOSIS — L97.929 VENOUS STASIS ULCERS OF BOTH LOWER EXTREMITIES: ICD-10-CM

## 2024-08-21 DIAGNOSIS — E03.9 HYPOTHYROIDISM, UNSPECIFIED TYPE: ICD-10-CM

## 2024-08-21 DIAGNOSIS — M79.7 FIBROMYALGIA, PRIMARY: ICD-10-CM

## 2024-08-21 DIAGNOSIS — I25.10 CORONARY ARTERIOSCLEROSIS: ICD-10-CM

## 2024-08-21 DIAGNOSIS — I50.9 HEART FAILURE, UNSPECIFIED HF CHRONICITY, UNSPECIFIED HEART FAILURE TYPE: ICD-10-CM

## 2024-08-21 PROCEDURE — G0439 PPPS, SUBSEQ VISIT: HCPCS | Mod: S$GLB,,, | Performed by: NURSE PRACTITIONER

## 2024-08-23 PROBLEM — I25.10 CORONARY ARTERIOSCLEROSIS: Status: ACTIVE | Noted: 2019-08-13

## 2024-08-23 PROBLEM — Z95.0 PACEMAKER: Status: ACTIVE | Noted: 2024-08-23

## 2024-08-23 PROBLEM — M79.7 FIBROMYALGIA, PRIMARY: Status: ACTIVE | Noted: 2021-12-02

## 2024-08-23 PROBLEM — L97.919 VENOUS STASIS ULCERS OF BOTH LOWER EXTREMITIES: Status: ACTIVE | Noted: 2024-08-23

## 2024-08-23 PROBLEM — I83.019 VENOUS STASIS ULCERS OF BOTH LOWER EXTREMITIES: Status: ACTIVE | Noted: 2024-08-23

## 2024-08-23 PROBLEM — E03.9 HYPOTHYROIDISM: Status: ACTIVE | Noted: 2021-12-02

## 2024-08-23 PROBLEM — I35.1 NONRHEUMATIC AORTIC VALVE INSUFFICIENCY: Status: ACTIVE | Noted: 2024-08-23

## 2024-08-23 PROBLEM — I83.029 VENOUS STASIS ULCERS OF BOTH LOWER EXTREMITIES: Status: ACTIVE | Noted: 2024-08-23

## 2024-08-23 PROBLEM — I10 ESSENTIAL HYPERTENSION: Status: ACTIVE | Noted: 2024-08-23

## 2024-08-23 PROBLEM — I87.2 PERIPHERAL VENOUS INSUFFICIENCY: Status: ACTIVE | Noted: 2020-07-22

## 2024-08-23 PROBLEM — L97.929 VENOUS STASIS ULCERS OF BOTH LOWER EXTREMITIES: Status: ACTIVE | Noted: 2024-08-23

## 2024-08-23 PROBLEM — I48.0 PAROXYSMAL ATRIAL FIBRILLATION: Status: ACTIVE | Noted: 2024-08-23

## 2024-08-23 PROBLEM — I50.9 HEART FAILURE: Status: ACTIVE | Noted: 2024-08-23

## 2024-08-23 PROBLEM — I49.5 SICK SINUS SYNDROME: Status: ACTIVE | Noted: 2024-08-23

## 2024-08-23 NOTE — PROGRESS NOTES
Carina Elise presented for an initial Medicare AWV today. The following components were reviewed and updated:    Medical history  Family History  Social history  Allergies and Current Medications  Health Risk Assessment  Health Maintenance  Care Team    **See Completed Assessments for Annual Wellness visit with in the encounter summary    The following assessments were completed:  Depression Screening  Cognitive function Screening  Timed Get Up Test  Whisper Test      Opioid documentation:      Patient does not have a current opioid prescription.          Vitals:    08/21/24 0820 08/21/24 0857 08/21/24 0902   BP: (!) 171/73 (!) 163/66 (!) 152/77   Pulse: 76     SpO2: 96%     Weight: 43.1 kg (95 lb)     Height: 5' (1.524 m)       Body mass index is 18.55 kg/m².       Physical Exam  Constitutional:       Appearance: Normal appearance.   HENT:      Head: Normocephalic and atraumatic.      Nose: Nose normal.      Mouth/Throat:      Mouth: Mucous membranes are moist.   Eyes:      Extraocular Movements: Extraocular movements intact.      Pupils: Pupils are equal, round, and reactive to light.   Cardiovascular:      Rate and Rhythm: Normal rate and regular rhythm.   Pulmonary:      Effort: Pulmonary effort is normal.      Breath sounds: Normal breath sounds.   Abdominal:      General: Bowel sounds are normal.      Palpations: Abdomen is soft.   Musculoskeletal:      Cervical back: Normal range of motion and neck supple.   Skin:     General: Skin is warm and dry.   Neurological:      General: No focal deficit present.      Mental Status: She is alert and oriented to person, place, and time.   Psychiatric:         Mood and Affect: Mood normal.         Behavior: Behavior normal.         Diagnoses and health risks identified today and associated recommendations/orders:  1. Encounter for preventive health examination  Awv completed      2. Esophageal dysmotility  Has seen GI  has had studies    3. Centriacinar  emphysema  Chronic and stable. Continue current treatment. Follow with PCP.  Has nebulizer    4. Paroxysmal atrial fibrillation  Chronic and stable. Continue current treatment. Follow with PCP.  On rhytmol      5. Pacemaker  Chronic and stable. Continue current treatment. Follow with PCP.  Per cardiology      6. Sick sinus syndrome  Chronic and stable. Continue current treatment. Follow with PCP.  Per cardiology      7. Nonrheumatic aortic valve insufficiency  Chronic and stable. Continue current treatment. Follow with PCP.      8. Peripheral venous insufficiency  Seeing vascular surgery      9. Essential hypertension  On meds- BP has been running higher - discussed to monitor   And call PCP if not better      10. Coronary arteriosclerosis  Asa , monitored with cardiology      11. Hypothyroidism, unspecified type  On cytomel and synthroid      12. Fibromyalgia, primary  Chronic and stable. Continue current treatment. Follow with PCP.      13. Venous stasis ulcers of both lower extremities  Seeing vascular surgery- compression stockings and asa       14. Heart failure, unspecified HF chronicity, unspecified heart failure type  Chronic and stable. Continue current treatment. Follow with PCP.        Provided Carina with a 5-10 year written screening schedule and personal prevention plan. Recommendations were developed using the USPSTF age appropriate recommendations. Education, counseling, and referrals were provided as needed.  After Visit Summary printed and given to patient which includes a list of additional screenings\tests needed.    Fu  in1 yr for les Killian, CALLIE, APRN

## 2024-08-23 NOTE — PATIENT INSTRUCTIONS
Counseling and Referral of Other Preventative  (Italic type indicates deductible and co-insurance are waived)    Patient Name: Carina Elise  Today's Date: 8/23/2024    Health Maintenance       Date Due Completion Date    Shingles Vaccine (1 of 2) Never done ---    RSV Vaccine (Age 60+ and Pregnant patients) (1 - 1-dose 60+ series) Never done ---    Pneumococcal Vaccines (Age 65+) (2 of 2 - PPSV23 or PCV20) 12/26/2011 10/31/2011    COVID-19 Vaccine (1 - 2023-24 season) Never done ---    TETANUS VACCINE 08/21/2025 (Originally 7/16/1958) ---    Lipid Panel 07/02/2025 7/2/2024        No orders of the defined types were placed in this encounter.    The following information is provided to all patients.  This information is to help you find resources for any of the problems found today that may be affecting your health:                  Living healthy guide: www.[facility].[place].[facility]      Understanding Diabetes: www.diabetes.org      Eating healthy: www.cdc.gov/healthyweight      CDC home safety checklist: www.cdc.gov/steadi/patient.html      Agency on Aging: www.goea.[place].[facility]      Alcoholics anonymous (AA): www.aa.org      Physical Activity: www.[place].nih.gov/gn9wzct      Tobacco use: www.quitwithusla.org

## 2024-10-16 ENCOUNTER — TELEPHONE (OUTPATIENT)
Dept: FAMILY MEDICINE | Facility: CLINIC | Age: 84
End: 2024-10-16
Payer: MEDICARE

## 2024-10-16 DIAGNOSIS — M79.7 FIBROMYALGIA, PRIMARY: Primary | ICD-10-CM

## 2024-10-16 NOTE — TELEPHONE ENCOUNTER
----- Message from Barb sent at 10/16/2024 11:04 AM CDT -----  Regarding: Referral Request  Contact: pt  Type:  Patient Requesting Referral    Who Called:pt    Does the patient already have the specialty appointment scheduled?:no    If yes, what is the date of that appointment?:no    Referral to What Specialty:phys therapy     Reason for Referral: fiber mialgia    Does the patient want the referral with a specific physician?: requesting [facility]    [facility]    Patient Requesting a Response?:yes    Would the patient rather a call back or a response via [facility]? Call back    Best Call Back Number:[phone]    Additional Information:  requesting orders for phys therapy for mialgia at [facility] therapy   Goal Outcome Evaluation:  Plan of Care Reviewed With: patient  Patient Agreement with Plan of Care: agrees     Progress: improving  Outcome Evaluation: Pt was calm and cooperative this shift and had no issues or concerns.

## 2024-10-16 NOTE — TELEPHONE ENCOUNTER
"Spoke to pt to clarify location.  Pt states she had a procedure for venous insufficiency yesterday and they "blocked off"  three or four veins and was already improving.  States it was Marion Rojas.    "

## 2024-11-12 ENCOUNTER — TELEPHONE (OUTPATIENT)
Dept: FAMILY MEDICINE | Facility: CLINIC | Age: 84
End: 2024-11-12
Payer: MEDICARE

## 2024-11-12 DIAGNOSIS — M79.7 FIBROMYALGIA: Primary | ICD-10-CM

## 2024-11-12 DIAGNOSIS — I87.2 VENOUS INSUFFICIENCY: ICD-10-CM

## 2024-11-12 NOTE — TELEPHONE ENCOUNTER
----- Message from Yunior sent at 11/12/2024 10:32 AM CST -----  Regarding: referral  Contact: patient  Type:  Patient Returning Call    Who Called:patient  Who Left Message for Patient:staff  Does the patient know what this is regarding?:referral to [facility]  Would the patient rather a call back or a response via [facility]? Please let patient know  Best Call Back Number:[phone]  Additional Information:

## 2024-11-12 NOTE — TELEPHONE ENCOUNTER
Pt needs an updated referral from  , with dx of fibromyalgia and venous insufficiency for both legs . Referral pended

## 2024-11-22 ENCOUNTER — TELEPHONE (OUTPATIENT)
Dept: FAMILY MEDICINE | Facility: CLINIC | Age: 84
End: 2024-11-22
Payer: MEDICARE

## 2024-11-22 NOTE — TELEPHONE ENCOUNTER
----- Message from Susan sent at 11/22/2024  9:52 AM CST -----  Name of Who is Calling: LUIS GONG [[id number]]        What is the request in detail: Pt calling regarding a fax she needs to be sent to [phone]        Can the clinic reply by [facility]:No         What Number to Call Back if not in [facility]: Telephone Information:  Mobile          [phone]

## 2025-01-28 RX ORDER — LIOTHYRONINE SODIUM 5 UG/1
TABLET ORAL
Qty: 90 TABLET | Refills: 1 | Status: SHIPPED | OUTPATIENT
Start: 2025-01-28

## 2025-01-28 NOTE — TELEPHONE ENCOUNTER
Refill Decision Note   Carina Elise  is requesting a refill authorization.  Brief Assessment and Rationale for Refill:  Approve     Medication Therapy Plan:        Comments:     Note composed:4:23 PM 01/28/2025

## 2025-01-28 NOTE — TELEPHONE ENCOUNTER
No care due was identified.  [facility] Embedded Care Due Messages. Reference number: [id number].   1/28/2025 1:22:30 PM CST

## 2025-01-29 ENCOUNTER — LAB VISIT (OUTPATIENT)
Dept: LAB | Facility: HOSPITAL | Age: 85
End: 2025-01-29
Attending: INTERNAL MEDICINE
Payer: MEDICARE

## 2025-01-29 DIAGNOSIS — I10 PRIMARY HYPERTENSION: ICD-10-CM

## 2025-01-29 LAB
ALBUMIN SERPL BCP-MCNC: 3.6 G/DL (ref 3.5–5.2)
ALP SERPL-CCNC: 91 U/L (ref 40–150)
ALT SERPL W/O P-5'-P-CCNC: 16 U/L (ref 10–44)
ANION GAP SERPL CALC-SCNC: 9 MMOL/L (ref 8–16)
AST SERPL-CCNC: 20 U/L (ref 10–40)
BASOPHILS # BLD AUTO: 0.03 K/UL (ref 0–0.2)
BASOPHILS NFR BLD: 0.6 % (ref 0–1.9)
BILIRUB SERPL-MCNC: 0.3 MG/DL (ref 0.1–1)
BUN SERPL-MCNC: 16 MG/DL (ref 8–23)
CALCIUM SERPL-MCNC: 9.4 MG/DL (ref 8.7–10.5)
CHLORIDE SERPL-SCNC: 103 MMOL/L (ref 95–110)
CO2 SERPL-SCNC: 28 MMOL/L (ref 23–29)
CREAT SERPL-MCNC: 0.8 MG/DL (ref 0.5–1.4)
DIFFERENTIAL METHOD BLD: NORMAL
EOSINOPHIL # BLD AUTO: 0.1 K/UL (ref 0–0.5)
EOSINOPHIL NFR BLD: 1.3 % (ref 0–8)
ERYTHROCYTE [DISTWIDTH] IN BLOOD BY AUTOMATED COUNT: 13.6 % (ref 11.5–14.5)
EST. GFR  (NO RACE VARIABLE): >60 ML/MIN/1.73 M^2
GLUCOSE SERPL-MCNC: 91 MG/DL (ref 70–110)
HCT VFR BLD AUTO: 40.9 % (ref 37–48.5)
HGB BLD-MCNC: 13.1 G/DL (ref 12–16)
IMM GRANULOCYTES # BLD AUTO: 0.01 K/UL (ref 0–0.04)
IMM GRANULOCYTES NFR BLD AUTO: 0.2 % (ref 0–0.5)
LYMPHOCYTES # BLD AUTO: 1.5 K/UL (ref 1–4.8)
LYMPHOCYTES NFR BLD: 32.2 % (ref 18–48)
MCH RBC QN AUTO: 30.6 PG (ref 27–31)
MCHC RBC AUTO-ENTMCNC: 32 G/DL (ref 32–36)
MCV RBC AUTO: 96 FL (ref 82–98)
MONOCYTES # BLD AUTO: 0.5 K/UL (ref 0.3–1)
MONOCYTES NFR BLD: 10.1 % (ref 4–15)
NEUTROPHILS # BLD AUTO: 2.6 K/UL (ref 1.8–7.7)
NEUTROPHILS NFR BLD: 55.6 % (ref 38–73)
NRBC BLD-RTO: 0 /100 WBC
PLATELET # BLD AUTO: 206 K/UL (ref 150–450)
PMV BLD AUTO: 12.5 FL (ref 9.2–12.9)
POTASSIUM SERPL-SCNC: 4.3 MMOL/L (ref 3.5–5.1)
PROT SERPL-MCNC: 7.4 G/DL (ref 6–8.4)
RBC # BLD AUTO: 4.28 M/UL (ref 4–5.4)
SODIUM SERPL-SCNC: 140 MMOL/L (ref 136–145)
TSH SERPL DL<=0.005 MIU/L-ACNC: 1.16 UIU/ML (ref 0.4–4)
WBC # BLD AUTO: 4.75 K/UL (ref 3.9–12.7)

## 2025-01-29 PROCEDURE — 85025 COMPLETE CBC W/AUTO DIFF WBC: CPT | Performed by: INTERNAL MEDICINE

## 2025-01-29 PROCEDURE — 84443 ASSAY THYROID STIM HORMONE: CPT | Performed by: INTERNAL MEDICINE

## 2025-01-29 PROCEDURE — 80053 COMPREHEN METABOLIC PANEL: CPT | Performed by: INTERNAL MEDICINE

## 2025-01-29 PROCEDURE — 36415 COLL VENOUS BLD VENIPUNCTURE: CPT | Mod: PO | Performed by: INTERNAL MEDICINE

## 2025-02-04 ENCOUNTER — OFFICE VISIT (OUTPATIENT)
Dept: FAMILY MEDICINE | Facility: CLINIC | Age: 85
End: 2025-02-04
Payer: MEDICARE

## 2025-02-04 VITALS
BODY MASS INDEX: 18.91 KG/M2 | HEIGHT: 60 IN | DIASTOLIC BLOOD PRESSURE: 80 MMHG | HEART RATE: 65 BPM | RESPIRATION RATE: 18 BRPM | OXYGEN SATURATION: 96 % | WEIGHT: 96.31 LBS | SYSTOLIC BLOOD PRESSURE: 152 MMHG | TEMPERATURE: 98 F

## 2025-02-04 DIAGNOSIS — I87.2 VENOUS INSUFFICIENCY: ICD-10-CM

## 2025-02-04 DIAGNOSIS — E03.9 HYPOTHYROIDISM, UNSPECIFIED TYPE: ICD-10-CM

## 2025-02-04 DIAGNOSIS — Z95.0 PACEMAKER: ICD-10-CM

## 2025-02-04 DIAGNOSIS — I48.0 PAROXYSMAL ATRIAL FIBRILLATION: Primary | ICD-10-CM

## 2025-02-04 DIAGNOSIS — J43.9 PULMONARY EMPHYSEMA, UNSPECIFIED EMPHYSEMA TYPE: ICD-10-CM

## 2025-02-04 DIAGNOSIS — M25.50 POLYARTHRALGIA: ICD-10-CM

## 2025-02-04 DIAGNOSIS — I49.5 SSS (SICK SINUS SYNDROME): ICD-10-CM

## 2025-02-04 DIAGNOSIS — F41.9 ANXIETY: ICD-10-CM

## 2025-02-04 DIAGNOSIS — N39.3 STRESS INCONTINENCE: ICD-10-CM

## 2025-02-04 DIAGNOSIS — M81.0 AGE-RELATED OSTEOPOROSIS WITHOUT CURRENT PATHOLOGICAL FRACTURE: ICD-10-CM

## 2025-02-04 DIAGNOSIS — I80.9 THROMBOPHLEBITIS: ICD-10-CM

## 2025-02-04 DIAGNOSIS — L40.8 SEBOPSORIASIS: ICD-10-CM

## 2025-02-04 DIAGNOSIS — I10 PRIMARY HYPERTENSION: ICD-10-CM

## 2025-02-04 DIAGNOSIS — K21.9 GASTROESOPHAGEAL REFLUX DISEASE WITHOUT ESOPHAGITIS: ICD-10-CM

## 2025-02-04 DIAGNOSIS — M79.7 FIBROMYALGIA, PRIMARY: ICD-10-CM

## 2025-02-04 PROCEDURE — G2211 COMPLEX E/M VISIT ADD ON: HCPCS | Mod: S$GLB,,, | Performed by: INTERNAL MEDICINE

## 2025-02-04 PROCEDURE — 99214 OFFICE O/P EST MOD 30 MIN: CPT | Mod: S$GLB,,, | Performed by: INTERNAL MEDICINE

## 2025-02-04 RX ORDER — LEVALBUTEROL TARTRATE 45 UG/1
1-2 AEROSOL, METERED ORAL EVERY 4 HOURS PRN
Qty: 15 G | Refills: 0 | Status: SHIPPED | OUTPATIENT
Start: 2025-02-04 | End: 2026-02-04

## 2025-02-04 NOTE — PROGRESS NOTES
Subjective:       Patient ID: Carina Elise is a 84 y.o. female.    Medication List with Changes/Refills   New Medications    LEVALBUTEROL (XOPENEX HFA) 45 MCG/ACTUATION INHALER    Inhale 1-2 puffs into the lungs every 4 (four) hours as needed for Wheezing. Rescue   Current Medications    ASPIRIN (ECOTRIN) 81 MG EC TABLET    Take 81 mg by mouth once daily.    CALCIUM CARBONATE (TUMS ORAL)        CARBOXYMETHYLCELLULOSE SODIUM (ARTIFICIAL TEARS, CMC,) 1 % DROP    Apply to eye.    LEVALBUTEROL (XOPENEX) 1.25 MG/3 ML NEBULIZER SOLUTION    Take 3 mLs (1.25 mg total) by nebulization every 4 (four) hours as needed for Wheezing. Rescue    LIOTHYRONINE (CYTOMEL) 5 MCG TAB    TAKE 1 TABLET DAILY    LYSINE ORAL    Take 500 mg by mouth once daily.    METOPROLOL SUCCINATE (TOPROL-XL) 25 MG 24 HR TABLET    Take 12.5 mg by mouth once daily. Take half tab daily    PROPAFENONE (RHTHYMOL) 150 MG TAB    Take 150 mg by mouth 2 (two) times daily.    SYNTHROID 112 MCG TABLET    TAKE 1 TABLET DAILY    TUMERIC-GING-OLIVE-OREG-CAPRYL 100 MG-150 MG- 50 MG-150 MG CAP    Take by mouth.    VITAMIN D (VITAMIN D3) 1000 UNITS TAB    Take 1,000 Units by mouth.       Chief Complaint: Follow-up  She is here today to f/u on chronic medical issues.      She has emphysema and is followed by pulmonary. She was last seen on 5/2024.  She has chronic shortness of breath with mild increase in coughing.  She has xopenex but would like to have an inhaler. CXR on 1/2023 was clear with vascular calcifications.     She as paroxysmal Afib s/p RFA in 2011.  She has a pacemaker due SSS and denies any recurrent Afib since ablation. No known CAD or hypertension. She is taking rhthymol 150 mg bid  and metoprolol 12.5 mg daily. She is anticoagulated with aspirin daily. She is managed by Dr. Lao in cardiology.  She had a stress echo per patient that was reassuring.  Lipids on 7/2024 were 153/52/67/75. She denies any chest pain.      She has venous insufficiency with  intermittent lower leg swelling. She underwent right sided venous ablation with sclerotherapy x 3 treatments.  She continues to follow with vascular and is scheduled to have similar therapy on her left leg in March. She does report the swelling in her right leg is improved. She continues to have pain due to recovery in her right leg.      She has hypothyroid and is taking levothyroxine 112 mcg daily and cytomel 5 mcg daily.  She feels this is well controlled and TSH on 1/2025 was normal.      She has acid reflux with dyspepsia and unable to take PPI due to intolerance in the past. EGD on 2/2023 was normal with biopsy showing chronic gastritis with GERD.  Esophogram on 12/2022 showed esophageal dysmotility.  She was seen by GI on 1/2023 and started on famotidine.  She does feel her symptoms have improved after her EGD and only uses famotidine 20 mg PRN.      She does complain of mild stress incontinence with laughing, sneezing or bending forward.  She feels this is stable.      Recall there was concern for the diagnosis of temporal arteritis due to elevated inflammatory markers, headaches and weakness.  She was noted to have jaw pain due to chewing fatigue but no claudication.  She had refused the TA biopsy in the past.  Labs showed ESR of 18 with CRP of 51.  Normal u/s of the temporal arteries.  Normal CBC, CMP and SPEP. Her headaches have resolved and she denies any jaw pain. She continues to have diffuse muscle pain but feels it is improved with starting PT.  Her last inflammatory markers on 5/2023 were normal with ESR 15 and CRP 7.8. 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  She was given reassurance.       She has fibromyalgia for many years that will flare with muscle aches and weakness. She is treated with regular exercise through physical therapy. She does report mild worsening symptoms since her venous sclerotherapy but this is improving with PT and  stretching exercises. She continues on L-lysine and tumeric for inflammation.      She has intermittent anxiety but denies depression. She has had for many years and has only tried xanax which she did not like. She is sleeping well. She is using [employer] services but they are unreliable. She does not want treatment.      She has osteoporosis with DEXA on 1/2024 showing fracture risk  of 9.7%, hip fracture risk of 3.8% (Tv -3.1, Tf -2.4). She does not want treatment.      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  She has ketoconazole shampoo but does not use. She is using a topical ointment.  She is losing hair in the areas that are effected.     She lives alone and her daughter is involved with her care. She exercises regularly with PT to help with strength and balance. This is improving. She eats healthy. Her weight has been stable since her initial weight loss last year. She is eating 3 meals a day with snacks and boost.        Colonoscopy---refused   Mammogram---3/2023 neg ----refused further testing   DEXA-----1/2024 osteoporosis with fracture risk  of 9.7%, hip fracture risk of 3.8% (Tv -3.1, Tf -2.4)  Tdap---unknown  Influenza vaccine---refused   Pneumovax 23----refused  Prevnar 13----10/2011  Shingrex vaccine-----refused   Covid vaccine---refused   RSV vaccine----refused      Review of Systems   Constitutional:  Negative for appetite change, fatigue, fever and unexpected weight change.   HENT:  Positive for congestion and postnasal drip. Negative for ear pain, hearing loss, sore throat and trouble swallowing.    Eyes:  Negative for pain and visual disturbance.   Respiratory:  Positive for shortness of breath. Negative for cough, chest tightness and wheezing.    Cardiovascular:  Positive for leg swelling. Negative for chest pain and palpitations.   Gastrointestinal:  Positive for constipation. Negative for abdominal pain, blood in stool, diarrhea, nausea and vomiting.   Endocrine: Negative for  polyuria.   Genitourinary:  Negative for dysuria and hematuria.   Musculoskeletal:  Positive for arthralgias and myalgias. Negative for back pain.   Skin:  Negative for rash.   Neurological:  Negative for dizziness, weakness, numbness and headaches.   Hematological:  Does not bruise/bleed easily.   Psychiatric/Behavioral:  Negative for dysphoric mood, sleep disturbance and suicidal ideas. The patient is not nervous/anxious.        Objective:      Vitals:    02/04/25 1024 02/04/25 1037   BP: (!) 180/64 (!) 152/80   BP Location: Left arm    Patient Position: Sitting    Pulse: 65    Resp: 18    Temp: 98.1 °F (36.7 °C)    TempSrc: Temporal    SpO2: 96%    Weight: 43.7 kg (96 lb 5.5 oz)    Height: 5' (1.524 m)      Body mass index is 18.82 kg/m².  Physical Exam    General appearance: No acute distress, cooperative  Eyes: PERRL, EOMI, conjunctiva clear  Ears: normal external ear and pinna, tm clear without drainage, canals clear  Nose: Normal mucosa without drainage  Throat: no exudates or erythema, tonsils not enlarged  Mouth: no sores or lesions, moist mucous membranes  Neck: FROM, soft, supple, no thyromegaly, no bruits  Lymph: no anterior or posterior cervical adenopathy  Heart::  Regular rate and rhythm, no murmur  Lung: Clear to ascultation bilaterally, no wheezing, no rales, no rhonchi, no distress  Abdomen: Soft, nontender, no distention, no hepatosplenomegaly, bowel sounds normal, no guarding, no rebound, no peritoneal signs  Skin: no rashes, no lesions  Extremities: right with trace edema, left with 1+ edema, tenderness on palpation of her lower legs  Neuro: CN 2-12 intact, 5/5 muscle strength upper and lower extremity bilaterally, 2+ DTRs UE and LE bilaterally, normal gait  Peripheral pulses: 1+ pedal pulses bilaterally  Musculoskeletal: FROM, good strenth, no tenderness  Joint: normal appearance, no swelling, no warmth, no deformity in all joints    Assessment:       1. Paroxysmal atrial fibrillation    2. SSS  (sick sinus syndrome)    3. Pacemaker    4. Primary hypertension    5. Venous insufficiency    6. Thrombophlebitis    7. Pulmonary emphysema, unspecified emphysema type    8. Hypothyroidism, unspecified type    9. Gastroesophageal reflux disease without esophagitis    10. Stress incontinence    11. Fibromyalgia, primary    12. Polyarthralgia    13. Anxiety    14. Sebopsoriasis    15. Age-related osteoporosis without current pathological fracture        Plan:       Paroxysmal atrial fibrillation  She continues on rhthymol and metoprolol.  Continue aspirin daily    SSS (sick sinus syndrome)/Pacemaker  She is doing well and continues to follow with cardiology    Primary hypertension  Uncontrolled and she does not check at home. Check at home and nurse to call for readings. She continues on metoprolol on 12.5 mg daily.   -     CBC Auto Differential; Future; Expected date: 02/04/2025  -     Comprehensive Metabolic Panel; Future; Expected date: 02/04/2025  -     TSH; Future; Expected date: 02/04/2025  -     Lipid Panel; Future; Expected date: 02/04/2025    Venous insufficiency/Thrombophlebitis  Successful sclerotherapy on the right and scheduled to have similar procedure on the left in March    Pulmonary emphysema, unspecified emphysema type  Mild increase in symptoms. Exam reassuring. Given xopenex inhaler.   -     levalbuterol (XOPENEX HFA) 45 mcg/actuation inhaler; Inhale 1-2 puffs into the lungs every 4 (four) hours as needed for Wheezing. Rescue  Dispense: 15 g; Refill: 0    Hypothyroidism, unspecified type  Good control on cytomel and levothyroxine    Gastroesophageal reflux disease without esophagitis  Good control on famotidine PRN    Stress incontinence  Stable  and she does not want treatment    Fibromyalgia, primary  Increased symptoms that are improving    Polyarthralgia  Improving with PT    Anxiety  At baseline and mild    Sebopsoriasis  Continue topical therapy    Age-related osteoporosis without current  pathological fracture  She does not want treatment. DEXA next due on 1/2026.     Follow up in about 6 months (around 8/4/2025) for chronic medical issues.

## 2025-02-12 ENCOUNTER — TELEPHONE (OUTPATIENT)
Dept: FAMILY MEDICINE | Facility: CLINIC | Age: 85
End: 2025-02-12

## 2025-02-12 ENCOUNTER — CLINICAL SUPPORT (OUTPATIENT)
Dept: FAMILY MEDICINE | Facility: CLINIC | Age: 85
End: 2025-02-12
Payer: MEDICARE

## 2025-02-12 DIAGNOSIS — Z01.30 BLOOD PRESSURE CHECK: Primary | ICD-10-CM

## 2025-02-12 NOTE — PROGRESS NOTES
Carina Elise 84 y.o. female is here today for Blood Pressure check.   History of HTN no.    Review of patient's allergies indicates:   Allergen Reactions    Acetazolamide Other (See Comments)    Adhesive Other (See Comments)    Albuterol     Amitriptyline Other (See Comments)    Antihistamines - alkylamine Other (See Comments)    Antivert [meclizine]     Azithromycin Other (See Comments)    Benadryl allergy decongestant     Benzodiazepines     Calcium channel blocking agent diltiazem analogues     Cardizem [diltiazem hcl] Other (See Comments)     High bp    Chlorzoxazone Other (See Comments)    Cholestyramine     Ciprofloxacin Other (See Comments)    Clindamycin Other (See Comments)     Mult others scanned in    Corticosteroids (glucocorticoids) Other (See Comments)     Burning sensation to area on application  Burning sensation to area on application      Diamox (sodium)     Diazepam Other (See Comments)    Diphenhydramine hcl Other (See Comments)    Docusate calcium Other (See Comments)    Doxycycline Other (See Comments)    Epinephrine Other (See Comments)    Erythromycin Other (See Comments)    Erythromycin base     Estradiol Other (See Comments)    Hydralazine Other (See Comments)    Hydrochlorothiazide     Levaquin [levofloxacin] Other (See Comments)     Sees black spots, spinning    Lidocaine      Pt unable to recall reaction    Medrol [methylprednisolone]     Medroxyprogesterone     Nefazodone Other (See Comments)    Nitrofurantoin     Nitrofurantoin monohyd/m-cryst Other (See Comments)    Omeprazole     Opioids - morphine analogues     Oxycodone     Oxycodone-acetaminophen     Paroxetine     Paroxetine hcl Other (See Comments)    Penicillins Hives and Other (See Comments)    Prednisone     Propoxyphene     Raloxifene Other (See Comments)    Sertraline Other (See Comments)    Tessalon [benzonatate]     Tetanus and diphtheria toxoids Other (See Comments)    Tetanus immune globulin Other (See Comments)     Tetanus vaccines and toxoid Swelling    Tetracyclines     Topiramate Other (See Comments)    Tramadol Other (See Comments)    Verapamil Other (See Comments)    Zolpidem Other (See Comments)    Aspirin Palpitations and Other (See Comments)    Codeine Rash and Other (See Comments)    Darvocet a500 [propoxyphene n-acetaminophen] Rash    Iodinated contrast media Palpitations and Other (See Comments)     Racing heart, uncontrollable shakes    Meperidine Rash    Morphine Rash    Sulfa (sulfonamide antibiotics) Rash and Other (See Comments)     Creatinine   Date Value Ref Range Status   01/29/2025 0.8 0.5 - 1.4 mg/dL Final     Sodium   Date Value Ref Range Status   01/29/2025 140 136 - 145 mmol/L Final     Potassium   Date Value Ref Range Status   01/29/2025 4.3 3.5 - 5.1 mmol/L Final   ]  Patient verifies taking blood pressure medications on a regular basis at the same time of the day.     Current Outpatient Medications:     aspirin (ECOTRIN) 81 MG EC tablet, Take 81 mg by mouth once daily., Disp: , Rfl:     calcium carbonate (TUMS ORAL), , Disp: , Rfl:     carboxymethylcellulose sodium (ARTIFICIAL TEARS, CMC,) 1 % Drop, Apply to eye., Disp: , Rfl:     levalbuterol (XOPENEX HFA) 45 mcg/actuation inhaler, Inhale 1-2 puffs into the lungs every 4 (four) hours as needed for Wheezing. Rescue, Disp: 15 g, Rfl: 0    levalbuterol (XOPENEX) 1.25 mg/3 mL nebulizer solution, Take 3 mLs (1.25 mg total) by nebulization every 4 (four) hours as needed for Wheezing. Rescue, Disp: 1080 mL, Rfl: 5    liothyronine (CYTOMEL) 5 MCG Tab, TAKE 1 TABLET DAILY, Disp: 90 tablet, Rfl: 1    LYSINE ORAL, Take 500 mg by mouth once daily., Disp: , Rfl:     metoprolol succinate (TOPROL-XL) 25 MG 24 hr tablet, Take 12.5 mg by mouth once daily. Take half tab daily, Disp: , Rfl:     propafenone (RHTHYMOL) 150 MG Tab, Take 150 mg by mouth 2 (two) times daily., Disp: , Rfl:     SYNTHROID 112 mcg tablet, TAKE 1 TABLET DAILY, Disp: 90 tablet, Rfl: 2     tumeric-ging-olive-oreg-capryl 100 mg-150 mg- 50 mg-150 mg Cap, Take by mouth., Disp: , Rfl:     vitamin D (VITAMIN D3) 1000 units Tab, Take 1,000 Units by mouth., Disp: , Rfl:   Does patient have record of home blood pressure readings yes.   2/5/25  PM 97/59 P66  2/6/25 /72 P64   /62 P67  2/7 PM  112/62 P64  2/8 AM  114/67 P60   PM  129/71 P67  2/9 AM  124/69 P65   PM  108/60 P70  2/10 AM  115/67 59   PM  128/70 64  2/11 AM   99/60 60   PM  102/63 64  2/12 AM   115/66 60    Last dose of blood pressure medication was taken at 9 AM.  Patient is asymptomatic.     Patient instructed to continue same medication exactly as she has been and we will contact her if Dr. Diaz wants to make changes. Patient verbalized understanding     Dr. Diaz notified.

## 2025-02-12 NOTE — TELEPHONE ENCOUNTER
Patient verifies taking blood pressure medications on a regular basis at the same time of the day.   Does patient have record of home blood pressure readings yes.   2/5/25  PM 97/59         P66  2/6/25AM 144/72P64              /62       P67  2/7       PM  112/62      P64  2/8       AM  114/67      P60              PM  129/71      P67  2/9       AM  124/69      P65              PM  108/60      P70  2/10     AM  115/67      59              PM  128/70      64  2/11     AM   99/60       60              PM  102/63      64  2/12     AM   115/66     60     Last dose of blood pressure medication was taken at 9 AM.  Patient is asymptomatic.      Patient instructed to continue same medication exactly as she has been and we will contact her if Dr. Diaz wants to make changes. Patient verbalized understanding

## 2025-02-14 VITALS — DIASTOLIC BLOOD PRESSURE: 66 MMHG | SYSTOLIC BLOOD PRESSURE: 115 MMHG

## 2025-02-25 DIAGNOSIS — E03.9 HYPOTHYROIDISM, UNSPECIFIED TYPE: ICD-10-CM

## 2025-02-25 RX ORDER — LEVOTHYROXINE SODIUM 112 UG/1
112 TABLET ORAL
Qty: 90 TABLET | Refills: 3 | Status: SHIPPED | OUTPATIENT
Start: 2025-02-25

## 2025-02-25 NOTE — TELEPHONE ENCOUNTER
No care due was identified.  [facility] Embedded Care Due Messages. Reference number: [id number].   2/25/2025 10:04:39 AM CST

## 2025-02-25 NOTE — TELEPHONE ENCOUNTER
Refill Decision Note   Carina Elise  is requesting a refill authorization.  Brief Assessment and Rationale for Refill:  Approve     Medication Therapy Plan:  Drug drug: SYNTHROID and liothyronine      Pharmacist review requested: Yes   Comments:     Note composed:11:10 AM 02/25/2025

## 2025-02-25 NOTE — TELEPHONE ENCOUNTER
Refill Routing Note   Medication(s) are not appropriate for processing by [facility] for the following reason(s):        Drug-drug interaction    [facility] action(s):  Defer        Medication Therapy Plan: Drug drug: SYNTHROID and liothyronine    Pharmacist review requested: Yes     Appointments  past 12m or future 3m with PCP    Date Provider   Last Visit   2/4/2025 Tesha Diaz, DO   Next Visit   8/13/2025 Tesha Diaz, DO   ED visits in past 90 days: 0        Note composed:11:08 AM 02/25/2025

## 2025-03-25 ENCOUNTER — TELEPHONE (OUTPATIENT)
Dept: PODIATRY | Facility: CLINIC | Age: 85
End: 2025-03-25
Payer: MEDICARE

## 2025-04-15 ENCOUNTER — OFFICE VISIT (OUTPATIENT)
Dept: OPTOMETRY | Facility: CLINIC | Age: 85
End: 2025-04-15
Payer: MEDICARE

## 2025-04-15 DIAGNOSIS — H52.03 HYPEROPIA WITH ASTIGMATISM AND PRESBYOPIA, BILATERAL: ICD-10-CM

## 2025-04-15 DIAGNOSIS — Z96.1 PSEUDOPHAKIA OF BOTH EYES: ICD-10-CM

## 2025-04-15 DIAGNOSIS — H16.223 KERATOCONJUNCTIVITIS SICCA OF BOTH EYES NOT SPECIFIED AS SJOGREN'S: Primary | ICD-10-CM

## 2025-04-15 DIAGNOSIS — H35.363 PERIPHERAL DRUSEN OF BOTH EYES: ICD-10-CM

## 2025-04-15 DIAGNOSIS — H52.4 HYPEROPIA WITH ASTIGMATISM AND PRESBYOPIA, BILATERAL: ICD-10-CM

## 2025-04-15 DIAGNOSIS — H04.123 DRY EYE SYNDROME OF BILATERAL LACRIMAL GLANDS: ICD-10-CM

## 2025-04-15 DIAGNOSIS — H52.203 HYPEROPIA WITH ASTIGMATISM AND PRESBYOPIA, BILATERAL: ICD-10-CM

## 2025-04-15 PROCEDURE — 92015 DETERMINE REFRACTIVE STATE: CPT | Mod: ,,,

## 2025-04-15 PROCEDURE — 99212 OFFICE O/P EST SF 10 MIN: CPT | Mod: PBBFAC,PO

## 2025-04-15 PROCEDURE — 92014 COMPRE OPH EXAM EST PT 1/>: CPT | Mod: S$PBB,,,

## 2025-04-15 PROCEDURE — 99999 PR PBB SHADOW E&M-EST. PATIENT-LVL II: CPT | Mod: PBBFAC,,,

## 2025-04-15 RX ORDER — CYCLOSPORINE 0.5 MG/ML
1 EMULSION OPHTHALMIC 2 TIMES DAILY
Qty: 60 EACH | Refills: 11 | Status: SHIPPED | OUTPATIENT
Start: 2025-04-15 | End: 2025-04-17

## 2025-04-15 NOTE — PROGRESS NOTES
HPI    Annual - HARRY 05/23/23     Pt would like updated spec Rx, current specs have scratches on lenses. Pt   noticed OS watering often, pt uses AT and allergy gtts to aid but no   relief. Pt denies eye irritation and pain. Pt notices occasional floaters   and flashes.   Last edited by Elvira Babin, OD on 4/15/2025 11:32 AM.            Assessment /Plan     For exam results, see Encounter Report.    Keratoconjunctivitis sicca of both eyes not specified as Sjogren's  -     Discontinue: cycloSPORINE (RESTASIS) 0.05 % ophthalmic emulsion; Place 1 drop into both eyes 2 (two) times daily.  Dispense: 60 each; Refill: 11    Dry eye syndrome of bilateral lacrimal glands  -     cycloSPORINE (RESTASIS) 0.05 % ophthalmic emulsion; Place 1 drop into both eyes 2 (two) times daily.  Dispense: 60 each; Refill: 11    Pseudophakia of both eyes    Peripheral drusen of both eyes    Hyperopia with astigmatism and presbyopia, bilateral      1-2. Longstanding dry eye signs and symptoms. Pt using OTC Refresh with minimal relief, advised to increase to BID-QID and incorporate gel drop/ottoniel QHS. Discussed further treatment options with pt and Rx'd Restasis to use BID OU. Ed pt that it my take 6-12 weeks for full efficacy and warned on stinging on instillation. Ed pt to RTC if any worsening signs or symptoms.    3. PCIOL with open capsules OU. Stable. Monitor yearly for changes.    4. Longstanding peripheral drusen OS>OD. No drusen in fovea OD, OS. Good BCVA. Monitor yearly for changes with repeat DFE.    5. Discussed spectacle options with pt and released final spec rx. Ed pt on change in rx and adaptation.    RTC: 1 year for comprehensive exam or sooner prn

## 2025-04-17 RX ORDER — CYCLOSPORINE 0.5 MG/ML
1 EMULSION OPHTHALMIC 2 TIMES DAILY
Qty: 60 EACH | Refills: 11 | Status: SHIPPED | OUTPATIENT
Start: 2025-04-17 | End: 2026-04-17

## 2025-04-17 RX ORDER — CYCLOSPORINE 0.5 MG/ML
1 EMULSION OPHTHALMIC 2 TIMES DAILY
Qty: 60 EACH | Refills: 11 | Status: SHIPPED | OUTPATIENT
Start: 2025-04-17 | End: 2025-04-17

## 2025-05-14 ENCOUNTER — TELEPHONE (OUTPATIENT)
Dept: FAMILY MEDICINE | Facility: CLINIC | Age: 85
End: 2025-05-14
Payer: MEDICARE

## 2025-05-14 NOTE — TELEPHONE ENCOUNTER
----- Message from Zuleyma sent at 5/14/2025  3:53 PM CDT -----  Contact: tobi  Type:  Needs Medical AdviceWho Called: [facility] from [facility] Symptoms (please be specific): requesting orders for PT and skilled nursing Would the patient rather a call back or a response via [facility]? callBest Call Back Number: [phone] Information: please advise and thank you.

## 2025-05-14 NOTE — TELEPHONE ENCOUNTER
Spoke with Stacy at [facility] - states pt cannot tolerate OP rehab and would like to have home PT. She also is seeing cardio to be checked for poss A-fib and was requesting skilled nursing orders for vital sign monitoring. Please advise.

## 2025-05-15 ENCOUNTER — OFFICE VISIT (OUTPATIENT)
Dept: FAMILY MEDICINE | Facility: CLINIC | Age: 85
End: 2025-05-15
Payer: MEDICARE

## 2025-05-15 VITALS
BODY MASS INDEX: 18.72 KG/M2 | OXYGEN SATURATION: 98 % | WEIGHT: 95.38 LBS | HEART RATE: 63 BPM | DIASTOLIC BLOOD PRESSURE: 70 MMHG | HEIGHT: 60 IN | RESPIRATION RATE: 18 BRPM | SYSTOLIC BLOOD PRESSURE: 158 MMHG | TEMPERATURE: 98 F

## 2025-05-15 DIAGNOSIS — R74.8 ELEVATION OF CARDIAC ENZYMES: ICD-10-CM

## 2025-05-15 DIAGNOSIS — Z87.19 S/P DILATATION OF ESOPHAGEAL STRICTURE: ICD-10-CM

## 2025-05-15 DIAGNOSIS — R53.1 GENERALIZED WEAKNESS: ICD-10-CM

## 2025-05-15 DIAGNOSIS — R26.89 BALANCE PROBLEM: ICD-10-CM

## 2025-05-15 DIAGNOSIS — K22.2 ESOPHAGEAL STENOSIS: Primary | ICD-10-CM

## 2025-05-15 DIAGNOSIS — I10 PRIMARY HYPERTENSION: ICD-10-CM

## 2025-05-15 DIAGNOSIS — K21.00 GASTROESOPHAGEAL REFLUX DISEASE WITH ESOPHAGITIS WITHOUT HEMORRHAGE: ICD-10-CM

## 2025-05-15 DIAGNOSIS — Z98.890 S/P DILATATION OF ESOPHAGEAL STRICTURE: ICD-10-CM

## 2025-05-15 PROCEDURE — 99214 OFFICE O/P EST MOD 30 MIN: CPT | Mod: S$GLB,,, | Performed by: INTERNAL MEDICINE

## 2025-05-15 PROCEDURE — G2211 COMPLEX E/M VISIT ADD ON: HCPCS | Mod: S$GLB,,, | Performed by: INTERNAL MEDICINE

## 2025-05-15 RX ORDER — AMLODIPINE BESYLATE 5 MG/1
5 TABLET ORAL DAILY
Qty: 90 TABLET | Refills: 3 | Status: SHIPPED | OUTPATIENT
Start: 2025-05-15 | End: 2026-05-15

## 2025-05-15 RX ORDER — FAMOTIDINE 20 MG/1
20 TABLET, FILM COATED ORAL 2 TIMES DAILY
Qty: 60 TABLET | Refills: 11 | Status: SHIPPED | OUTPATIENT
Start: 2025-05-15 | End: 2026-05-15

## 2025-05-15 NOTE — PATIENT INSTRUCTIONS
Start famotidine (pepcid) 20 mg twice a day to help prevent the trouble swallowing    For BP start amlodipine 5 mg once a day.  Check BP daily and respond to my message in about a week by phone call.

## 2025-05-15 NOTE — PROGRESS NOTES
Subjective:       Patient ID: Carina Elise is a 84 y.o. female.    Medication List with Changes/Refills   New Medications    AMLODIPINE (NORVASC) 5 MG TABLET    Take 1 tablet (5 mg total) by mouth once daily.    FAMOTIDINE (PEPCID) 20 MG TABLET    Take 1 tablet (20 mg total) by mouth 2 (two) times daily.   Current Medications    ASPIRIN (ECOTRIN) 81 MG EC TABLET    Take 81 mg by mouth once daily.    CALCIUM CARBONATE (TUMS ORAL)        CARBOXYMETHYLCELLULOSE SODIUM (ARTIFICIAL TEARS, [facility],) 1 % DROP    Apply to eye.    CYCLOSPORINE (RESTASIS) 0.05 % OPHTHALMIC EMULSION    Place 1 drop into both eyes 2 (two) times daily.    KETOCONAZOLE (NIZORAL) 2 % SHAMPOO    Lather into affected areas. Rinse off in 5-10 min. Repeat 2-3 times a week.    LIOTHYRONINE (CYTOMEL) 5 MCG TAB    TAKE 1 TABLET DAILY    LYSINE ORAL    Take 500 mg by mouth once daily.    METOPROLOL SUCCINATE (TOPROL-XL) 25 MG 24 HR TABLET    Take 12.5 mg by mouth once daily. Take half tab daily    PROPAFENONE (RHTHYMOL) 150 MG TAB    Take 150 mg by mouth 2 (two) times daily.    SYNTHROID 112 MCG TABLET    TAKE 1 TABLET DAILY    TUMERIC-GING-OLIVE-OREG-CAPRYL 100 MG-150 MG- 50 MG-150 MG CAP    Take by mouth.    VITAMIN D (VITAMIN D3) 1000 UNITS TAB    Take 1,000 Units by mouth.   Discontinued Medications    LEVALBUTEROL (XOPENEX HFA) 45 MCG/ACTUATION INHALER    Inhale 1-2 puffs into the lungs every 4 (four) hours as needed for Wheezing. Rescue    LEVALBUTEROL (XOPENEX) 1.25 MG/3 ML NEBULIZER SOLUTION    Take 3 mLs (1.25 mg total) by nebulization every 4 (four) hours as needed for Wheezing. Rescue       Chief Complaint: Follow-up  She presents today with a hospital follow up. Recall she has emphysema, paroxysmal Afib, venous insufficiency, hypothyroid, GERD and fibromyalgia with multiple drug allergies and intolerances.     She was admitted to [facility] on 4/22/2025 for 2 days with dysphagia and coughing up blood.  She underwent an EGD that showed  esophageal stenosis and was dilated. Biopsy showed submucosal xanthamtous inflammation with rare non-caseating granulomas, mild inflammation, no h pylori.  Since the dilatation she has done very well with only very mild symptoms. She is taking TUMs but is not taking a PPI (due to intolerance)  and not taking famotidine.     During her admission she was noted to very elevated cardiac enzymes.  She was evaluated by cardiology and recommended supportive care and an outpatient stress test.  She was seen by cardiology this week her scheduled for nuclear stress on 6/3/2025. She denies chest pain or shortness of breath.     She has hypertension and is taking metoprolol xl 12.5 mg daily. Her BP readings for the last month have been elevated with SBP running 150s-120s/70-80s.  She reports difficulty increasing her dose of metoprolol in the past.  Echo on 4/2025 showed EF 55-60%, small LV size, indeterminate diastolic dysfunction, mild MR, AR and TR.      She was d/c to a SNF for 5 days due to generalized weakness and balance problems.  She did fell this helped and was doing better on d/c. She was started with outpatient PT but this has been too difficult for her and she would like home health to do PT at home with skilled nursing to help monitor her BP. She is working with [facility].     Review of Systems   Constitutional:  Negative for appetite change, fatigue, fever and unexpected weight change.   HENT:  Negative for congestion, ear pain, hearing loss, sore throat and trouble swallowing.    Eyes:  Negative for pain and visual disturbance.   Respiratory:  Positive for shortness of breath. Negative for cough, chest tightness and wheezing.    Cardiovascular:  Negative for chest pain, palpitations and leg swelling.   Gastrointestinal:  Positive for constipation. Negative for abdominal pain, blood in stool, diarrhea, nausea and vomiting.   Endocrine: Negative for polyuria.   Genitourinary:  Negative for dysuria and  hematuria.   Musculoskeletal:  Negative for arthralgias, back pain and myalgias.   Skin:  Negative for rash.   Neurological:  Positive for light-headedness. Negative for dizziness, weakness, numbness and headaches.   Hematological:  Does not bruise/bleed easily.   Psychiatric/Behavioral:  Negative for dysphoric mood, sleep disturbance and suicidal ideas. The patient is not nervous/anxious.        Objective:      Vitals:    05/15/25 1132   BP: (!) 158/70   BP Location: Right arm   Patient Position: Sitting   Pulse: 63   Resp: 18   Temp: 97.9 °F (36.6 °C)   TempSrc: Temporal   SpO2: 98%   Weight: 43.2 kg (95 lb 5.6 oz)   Height: 5' (1.524 m)     Body mass index is 18.62 kg/m².  Physical Exam    General appearance: No acute distress, cooperative  Eyes: PERRL, EOMI, conjunctiva clear  Nose: Normal mucosa without drainage  Throat: no exudates or erythema, tonsils not enlarged  Mouth: no sores or lesions, moist mucous membranes  Neck: FROM, soft, supple, no thyromegaly, no bruits  Lymph: no anterior or posterior cervical adenopathy  Heart::  Regular rate and rhythm, no murmur  Lung: Clear to ascultation bilaterally, no wheezing, no rales, no rhonchi, no distress  Abdomen: Soft, nontender, no distention, no hepatosplenomegaly, bowel sounds normal, no guarding, no rebound, no peritoneal signs  Skin: no rashes, no lesions  Extremities: no edema, no cyanosis  Neuro: CN 2-12 intact, 5/5 muscle strength upper and lower extremity bilaterally, 2+ DTRs UE and LE bilaterally, normal gait but poor balance   Peripheral pulses: diminished pedal pulses bilaterally, good perfusion and color  Musculoskeletal: FROM, good strenth, no tenderness  Joint: normal appearance, no swelling, no warmth, no deformity in all joints    Assessment:       1. Esophageal stenosis    2. S/P dilatation of esophageal stricture    3. Gastroesophageal reflux disease with esophagitis without hemorrhage    4. Primary hypertension    5. Elevation of cardiac  enzymes    6. Generalized weakness    7. Balance problem        Plan:       Esophageal stenosis S/P dilatation of esophageal stricture  Symptoms improved.      Gastroesophageal reflux disease with esophagitis without hemorrhage  Uncontrolled and will start famotidine 20 mg bid. Unable to take PPIs.   -     famotidine (PEPCID) 20 MG tablet; Take 1 tablet (20 mg total) by mouth 2 (two) times daily.  Dispense: 60 tablet; Refill: 11    Primary hypertension  Uncontrolled and will add amlodipine 5 mg daily. Will call in one week for BP readings. Continue metoprolol xl 12.5 mg daily (struggles to increase the dose due to side effects).   -     amLODIPine (NORVASC) 5 MG tablet; Take 1 tablet (5 mg total) by mouth once daily.  Dispense: 90 tablet; Refill: 3  -     Ambulatory referral/consult to Home Health; Future; Expected date: 05/16/2025    Elevation of cardiac enzymes  Seen by cardiology this week and scheduled for stress test in June    Generalized weakness with balance problems  Referral to home health to help with monitoring of vital signs and PT for help with balance and strength.   -     Ambulatory referral/consult to Home Health; Future; Expected date: 05/16/2025    Follow up in about 3 months (around 8/15/2025) for chronic medical issues, already scheduled and one week via phone for a BP check .

## 2025-05-22 ENCOUNTER — TELEPHONE (OUTPATIENT)
Dept: FAMILY MEDICINE | Facility: CLINIC | Age: 85
End: 2025-05-22
Payer: MEDICARE

## 2025-05-22 VITALS — DIASTOLIC BLOOD PRESSURE: 64 MMHG | SYSTOLIC BLOOD PRESSURE: 122 MMHG

## 2025-07-22 RX ORDER — LIOTHYRONINE SODIUM 5 UG/1
5 TABLET ORAL
Qty: 90 TABLET | Refills: 3 | Status: SHIPPED | OUTPATIENT
Start: 2025-07-22

## 2025-07-22 NOTE — TELEPHONE ENCOUNTER
No care due was identified.  [facility] Embedded Care Due Messages. Reference number: [id number].   7/22/2025 3:36:34 PM CDT

## 2025-07-23 NOTE — TELEPHONE ENCOUNTER
Refill Decision Note   Carina Elise  is requesting a refill authorization.  Brief Assessment and Rationale for Refill:  Approve     Medication Therapy Plan:        Comments:     Note composed:9:38 PM 07/22/2025

## 2025-07-28 DIAGNOSIS — Z00.00 ENCOUNTER FOR MEDICARE ANNUAL WELLNESS EXAM: ICD-10-CM

## 2025-08-06 ENCOUNTER — LAB VISIT (OUTPATIENT)
Dept: LAB | Facility: HOSPITAL | Age: 85
End: 2025-08-06
Attending: INTERNAL MEDICINE
Payer: MEDICARE

## 2025-08-06 DIAGNOSIS — I10 PRIMARY HYPERTENSION: ICD-10-CM

## 2025-08-06 LAB
ABSOLUTE EOSINOPHIL (OHS): 0.07 K/UL
ABSOLUTE MONOCYTE (OHS): 0.58 K/UL (ref 0.3–1)
ABSOLUTE NEUTROPHIL COUNT (OHS): 3.03 K/UL (ref 1.8–7.7)
ALBUMIN SERPL BCP-MCNC: 3.9 G/DL (ref 3.5–5.2)
ALP SERPL-CCNC: 95 UNIT/L (ref 40–150)
ALT SERPL W/O P-5'-P-CCNC: 19 UNIT/L (ref 0–55)
ANION GAP (OHS): 9 MMOL/L (ref 8–16)
AST SERPL-CCNC: 28 UNIT/L (ref 0–50)
BASOPHILS # BLD AUTO: 0.02 K/UL
BASOPHILS NFR BLD AUTO: 0.4 %
BILIRUB SERPL-MCNC: 0.5 MG/DL (ref 0.1–1)
BUN SERPL-MCNC: 20 MG/DL (ref 8–23)
CALCIUM SERPL-MCNC: 9.1 MG/DL (ref 8.7–10.5)
CHLORIDE SERPL-SCNC: 105 MMOL/L (ref 95–110)
CHOLEST SERPL-MCNC: 158 MG/DL (ref 120–199)
CHOLEST/HDLC SERPL: 2.1 {RATIO} (ref 2–5)
CO2 SERPL-SCNC: 28 MMOL/L (ref 23–29)
CREAT SERPL-MCNC: 0.8 MG/DL (ref 0.5–1.4)
ERYTHROCYTE [DISTWIDTH] IN BLOOD BY AUTOMATED COUNT: 13.4 % (ref 11.5–14.5)
GFR SERPLBLD CREATININE-BSD FMLA CKD-EPI: >60 ML/MIN/1.73/M2
GLUCOSE SERPL-MCNC: 89 MG/DL (ref 70–110)
HCT VFR BLD AUTO: 42.6 % (ref 37–48.5)
HDLC SERPL-MCNC: 75 MG/DL (ref 40–75)
HDLC SERPL: 47.5 % (ref 20–50)
HGB BLD-MCNC: 13.4 GM/DL (ref 12–16)
IMM GRANULOCYTES # BLD AUTO: 0.02 K/UL (ref 0–0.04)
IMM GRANULOCYTES NFR BLD AUTO: 0.4 % (ref 0–0.5)
LDLC SERPL CALC-MCNC: 73.4 MG/DL (ref 63–159)
LYMPHOCYTES # BLD AUTO: 1.54 K/UL (ref 1–4.8)
MCH RBC QN AUTO: 30 PG (ref 27–31)
MCHC RBC AUTO-ENTMCNC: 31.5 G/DL (ref 32–36)
MCV RBC AUTO: 95 FL (ref 82–98)
NONHDLC SERPL-MCNC: 83 MG/DL
NUCLEATED RBC (/100WBC) (OHS): 0 /100 WBC
PLATELET # BLD AUTO: 206 K/UL (ref 150–450)
PMV BLD AUTO: 12.3 FL (ref 9.2–12.9)
POTASSIUM SERPL-SCNC: 4.2 MMOL/L (ref 3.5–5.1)
PROT SERPL-MCNC: 7.5 GM/DL (ref 6–8.4)
RBC # BLD AUTO: 4.47 M/UL (ref 4–5.4)
RELATIVE EOSINOPHIL (OHS): 1.3 %
RELATIVE LYMPHOCYTE (OHS): 29.3 % (ref 18–48)
RELATIVE MONOCYTE (OHS): 11 % (ref 4–15)
RELATIVE NEUTROPHIL (OHS): 57.6 % (ref 38–73)
SODIUM SERPL-SCNC: 142 MMOL/L (ref 136–145)
TRIGL SERPL-MCNC: 48 MG/DL (ref 30–150)
TSH SERPL-ACNC: 1.51 UIU/ML (ref 0.4–4)
WBC # BLD AUTO: 5.26 K/UL (ref 3.9–12.7)

## 2025-08-06 PROCEDURE — 85025 COMPLETE CBC W/AUTO DIFF WBC: CPT

## 2025-08-06 PROCEDURE — 80061 LIPID PANEL: CPT

## 2025-08-06 PROCEDURE — 82040 ASSAY OF SERUM ALBUMIN: CPT

## 2025-08-06 PROCEDURE — 84443 ASSAY THYROID STIM HORMONE: CPT

## 2025-08-06 PROCEDURE — 36415 COLL VENOUS BLD VENIPUNCTURE: CPT | Mod: PO

## 2025-08-13 ENCOUNTER — OFFICE VISIT (OUTPATIENT)
Dept: FAMILY MEDICINE | Facility: CLINIC | Age: 85
End: 2025-08-13
Payer: MEDICARE

## 2025-08-13 VITALS
WEIGHT: 93.69 LBS | TEMPERATURE: 98 F | OXYGEN SATURATION: 98 % | BODY MASS INDEX: 18.4 KG/M2 | DIASTOLIC BLOOD PRESSURE: 68 MMHG | HEIGHT: 60 IN | SYSTOLIC BLOOD PRESSURE: 136 MMHG | HEART RATE: 73 BPM | RESPIRATION RATE: 18 BRPM

## 2025-08-13 DIAGNOSIS — E07.89 THYROID PAIN: ICD-10-CM

## 2025-08-13 DIAGNOSIS — I87.2 VENOUS INSUFFICIENCY: ICD-10-CM

## 2025-08-13 DIAGNOSIS — N39.3 STRESS INCONTINENCE: ICD-10-CM

## 2025-08-13 DIAGNOSIS — M79.7 FIBROMYALGIA, PRIMARY: ICD-10-CM

## 2025-08-13 DIAGNOSIS — I49.5 SSS (SICK SINUS SYNDROME): ICD-10-CM

## 2025-08-13 DIAGNOSIS — I48.0 PAROXYSMAL ATRIAL FIBRILLATION: Primary | ICD-10-CM

## 2025-08-13 DIAGNOSIS — M81.0 AGE-RELATED OSTEOPOROSIS WITHOUT CURRENT PATHOLOGICAL FRACTURE: ICD-10-CM

## 2025-08-13 DIAGNOSIS — Z98.890 S/P DILATATION OF ESOPHAGEAL STRICTURE: ICD-10-CM

## 2025-08-13 DIAGNOSIS — M25.50 POLYARTHRALGIA: ICD-10-CM

## 2025-08-13 DIAGNOSIS — Z87.19 S/P DILATATION OF ESOPHAGEAL STRICTURE: ICD-10-CM

## 2025-08-13 DIAGNOSIS — I10 PRIMARY HYPERTENSION: ICD-10-CM

## 2025-08-13 DIAGNOSIS — J43.9 PULMONARY EMPHYSEMA, UNSPECIFIED EMPHYSEMA TYPE: ICD-10-CM

## 2025-08-13 DIAGNOSIS — F41.9 ANXIETY: ICD-10-CM

## 2025-08-13 DIAGNOSIS — K21.00 GASTROESOPHAGEAL REFLUX DISEASE WITH ESOPHAGITIS WITHOUT HEMORRHAGE: ICD-10-CM

## 2025-08-13 DIAGNOSIS — Z95.0 PACEMAKER: ICD-10-CM

## 2025-08-13 DIAGNOSIS — E03.9 HYPOTHYROIDISM, UNSPECIFIED TYPE: ICD-10-CM

## 2025-08-13 DIAGNOSIS — K22.2 ESOPHAGEAL STENOSIS: ICD-10-CM

## 2025-08-13 DIAGNOSIS — L40.8 SEBOPSORIASIS: ICD-10-CM

## 2025-08-13 DIAGNOSIS — I80.9 THROMBOPHLEBITIS: ICD-10-CM

## 2025-08-13 PROCEDURE — 99214 OFFICE O/P EST MOD 30 MIN: CPT | Mod: S$GLB,,, | Performed by: INTERNAL MEDICINE

## 2025-08-13 PROCEDURE — G2211 COMPLEX E/M VISIT ADD ON: HCPCS | Mod: S$GLB,,, | Performed by: INTERNAL MEDICINE

## 2025-08-13 RX ORDER — PANTOPRAZOLE SODIUM 20 MG/1
20 TABLET, DELAYED RELEASE ORAL DAILY
Qty: 90 TABLET | Refills: 3 | Status: SHIPPED | OUTPATIENT
Start: 2025-08-13 | End: 2026-08-13

## 2025-08-22 ENCOUNTER — HOSPITAL ENCOUNTER (OUTPATIENT)
Dept: RADIOLOGY | Facility: HOSPITAL | Age: 85
Discharge: HOME | End: 2025-08-22
Attending: INTERNAL MEDICINE
Payer: MEDICARE

## 2025-08-22 DIAGNOSIS — E07.89 THYROID PAIN: ICD-10-CM

## 2025-08-22 PROCEDURE — 76536 US EXAM OF HEAD AND NECK: CPT | Mod: 26,,, | Performed by: RADIOLOGY

## 2025-08-22 PROCEDURE — 76536 US EXAM OF HEAD AND NECK: CPT | Mod: TC,PO

## 2025-08-29 ENCOUNTER — TELEPHONE (OUTPATIENT)
Dept: FAMILY MEDICINE | Facility: CLINIC | Age: 85
End: 2025-08-29
Payer: MEDICARE

## 2025-08-29 ENCOUNTER — TELEPHONE (OUTPATIENT)
Dept: ADMINISTRATIVE | Facility: CLINIC | Age: 85
End: 2025-08-29
Payer: MEDICARE

## 2025-09-05 ENCOUNTER — OFFICE VISIT (OUTPATIENT)
Dept: HOME HEALTH SERVICES | Facility: CLINIC | Age: 85
End: 2025-09-05
Payer: MEDICARE

## 2025-09-05 VITALS
BODY MASS INDEX: 18.26 KG/M2 | HEIGHT: 60 IN | SYSTOLIC BLOOD PRESSURE: 125 MMHG | DIASTOLIC BLOOD PRESSURE: 56 MMHG | HEART RATE: 68 BPM | WEIGHT: 93 LBS | OXYGEN SATURATION: 94 %

## 2025-09-05 DIAGNOSIS — I50.9 HEART FAILURE, UNSPECIFIED HF CHRONICITY, UNSPECIFIED HEART FAILURE TYPE: ICD-10-CM

## 2025-09-05 DIAGNOSIS — E03.9 HYPOTHYROIDISM, UNSPECIFIED TYPE: ICD-10-CM

## 2025-09-05 DIAGNOSIS — R63.6 UNDERWEIGHT: ICD-10-CM

## 2025-09-05 DIAGNOSIS — I25.10 CORONARY ARTERIOSCLEROSIS: ICD-10-CM

## 2025-09-05 DIAGNOSIS — M79.7 FIBROMYALGIA, PRIMARY: ICD-10-CM

## 2025-09-05 DIAGNOSIS — I87.2 PERIPHERAL VENOUS INSUFFICIENCY: ICD-10-CM

## 2025-09-05 DIAGNOSIS — I10 ESSENTIAL HYPERTENSION: ICD-10-CM

## 2025-09-05 DIAGNOSIS — I49.5 SICK SINUS SYNDROME: ICD-10-CM

## 2025-09-05 DIAGNOSIS — I48.0 PAROXYSMAL ATRIAL FIBRILLATION: ICD-10-CM

## 2025-09-05 DIAGNOSIS — Z00.00 ENCOUNTER FOR MEDICARE ANNUAL WELLNESS EXAM: Primary | ICD-10-CM

## 2025-09-05 DIAGNOSIS — J40 BRONCHITIS: ICD-10-CM

## 2025-09-05 DIAGNOSIS — Z95.0 PACEMAKER: ICD-10-CM

## 2025-09-06 PROBLEM — I87.8 VENOUS STASIS: Status: ACTIVE | Noted: 2024-08-23
